# Patient Record
Sex: FEMALE | Race: WHITE | Employment: FULL TIME | URBAN - METROPOLITAN AREA
[De-identification: names, ages, dates, MRNs, and addresses within clinical notes are randomized per-mention and may not be internally consistent; named-entity substitution may affect disease eponyms.]

---

## 2019-08-20 LAB
ANTIBODY SCREEN, EXTERNAL: NEGATIVE
CHLAMYDIA, EXTERNAL: NEGATIVE
HBSAG, EXTERNAL: NEGATIVE
HIV, EXTERNAL: NON REACTIVE
N. GONORRHEA, EXTERNAL: NEGATIVE
RUBELLA, EXTERNAL: NORMAL
T. PALLIDUM, EXTERNAL: NON REACTIVE
TYPE, ABO & RH, EXTERNAL: NORMAL

## 2020-02-05 ENCOUNTER — HOSPITAL ENCOUNTER (EMERGENCY)
Age: 35
Discharge: HOME | End: 2020-02-05
Attending: EMERGENCY MEDICINE
Payer: COMMERCIAL

## 2020-02-05 ENCOUNTER — APPOINTMENT (OUTPATIENT)
Dept: VASCULAR SURGERY | Age: 35
End: 2020-02-05
Attending: EMERGENCY MEDICINE
Payer: COMMERCIAL

## 2020-02-05 VITALS
WEIGHT: 208 LBS | HEART RATE: 92 BPM | HEIGHT: 64 IN | DIASTOLIC BLOOD PRESSURE: 82 MMHG | TEMPERATURE: 98 F | RESPIRATION RATE: 18 BRPM | SYSTOLIC BLOOD PRESSURE: 124 MMHG | OXYGEN SATURATION: 98 % | BODY MASS INDEX: 35.51 KG/M2

## 2020-02-05 DIAGNOSIS — I82.90 THROMBUS: Primary | ICD-10-CM

## 2020-02-05 PROCEDURE — 99283 EMERGENCY DEPT VISIT LOW MDM: CPT

## 2020-02-05 PROCEDURE — 93971 EXTREMITY STUDY: CPT

## 2020-02-06 NOTE — ED TRIAGE NOTES
Pt ambulatory to ED with c/o right thigh redness and swelling onset Sunday with SOB. Pt is 33 weeks pregnant and was advised to be evaluated in ED for DVT.

## 2020-02-06 NOTE — ED PROVIDER NOTES
36 y.o. female who is 35 weeks pregnant with no known pmh presents today with c/o right thigh erythema and swelling. Sent from her OB/GYN to evaluate for possible DVT. Denies any chest pain or shortness of breath. No history of this in the past.  No other acute complaints. No pregnancy related complaints. Recent travel. She is not a smoker. There are no other acute medical concerns at this time. Social hx: Never Smoker, Never EtOH Use, Never illicit drug use    Note written by Ciarra Barnes, as dictated by Jenniffer Booth NP 8:52 PM       The history is provided by the patient. No  was used. History reviewed. No pertinent past medical history. History reviewed. No pertinent surgical history. History reviewed. No pertinent family history.     Social History     Socioeconomic History    Marital status: Not on file     Spouse name: Not on file    Number of children: Not on file    Years of education: Not on file    Highest education level: Not on file   Occupational History    Not on file   Social Needs    Financial resource strain: Not on file    Food insecurity:     Worry: Not on file     Inability: Not on file    Transportation needs:     Medical: Not on file     Non-medical: Not on file   Tobacco Use    Smoking status: Never Smoker    Smokeless tobacco: Never Used   Substance and Sexual Activity    Alcohol use: Never     Frequency: Never    Drug use: Never    Sexual activity: Not on file   Lifestyle    Physical activity:     Days per week: Not on file     Minutes per session: Not on file    Stress: Not on file   Relationships    Social connections:     Talks on phone: Not on file     Gets together: Not on file     Attends Zoroastrian service: Not on file     Active member of club or organization: Not on file     Attends meetings of clubs or organizations: Not on file     Relationship status: Not on file    Intimate partner violence:     Fear of current or ex partner: Not on file     Emotionally abused: Not on file     Physically abused: Not on file     Forced sexual activity: Not on file   Other Topics Concern    Not on file   Social History Narrative    Not on file         ALLERGIES: Patient has no known allergies. Review of Systems   Constitutional: Negative for chills and fever. HENT: Negative for congestion and sore throat. Eyes: Negative for pain. Respiratory: Negative for shortness of breath. Cardiovascular: Positive for leg swelling. Negative for chest pain. Gastrointestinal: Negative for abdominal pain, diarrhea, nausea and vomiting. Genitourinary: Negative for dysuria and flank pain. Musculoskeletal: Negative for back pain and neck pain. Leg pain   Skin: Negative for rash. Neurological: Negative for dizziness and headaches. All other systems reviewed and are negative. Vitals:    02/05/20 1943   BP: 141/79   Pulse: (!) 106   Resp: 18   Temp: 99 °F (37.2 °C)   SpO2: 98%   Weight: 94.3 kg (208 lb)   Height: 5' 4\" (1.626 m)            Physical Exam  Vitals signs and nursing note reviewed. Constitutional:       Appearance: She is well-developed. HENT:      Head: Normocephalic. Eyes:      Conjunctiva/sclera: Conjunctivae normal.   Neck:      Musculoskeletal: Normal range of motion and neck supple. Cardiovascular:      Rate and Rhythm: Normal rate and regular rhythm. Pulmonary:      Effort: Pulmonary effort is normal. No respiratory distress. Breath sounds: Normal breath sounds. Abdominal:      General: Bowel sounds are normal.      Palpations: Abdomen is soft. Tenderness: There is no abdominal tenderness. Musculoskeletal: Normal range of motion. Comments: Redness, swelling, erythema, and tenderness to the touch to the lower medial inner thigh near the knee. Lower leg has multiple varicosities. Do not appreciate significant leg swelling. Skin:     General: Skin is warm.       Capillary Refill: Capillary refill takes less than 2 seconds. Findings: No rash. Neurological:      Mental Status: She is alert and oriented to person, place, and time. Comments: No gross motor or sensory deficits   Psychiatric:         Mood and Affect: Mood normal.         Behavior: Behavior normal.        Note written by Ciarra Cortes, as dictated by Jeana Geller NP 8:52 PM    MDM  Number of Diagnoses or Management Options  Thrombus:   Diagnosis management comments: Patient is a 29-year-old female who is 33 weeks pregnant who presents with left lower medial thigh erythema, swelling, and tenderness localized at 3 cm area. She was sent to evaluate for DVT. Venous Doppler show superficial thrombus in the distal aspect of the greater saphenous vein. While this is normally treated with NSAIDs and warm compresses, she is at higher hypercoagulability risk given that she is pregnant. Discussed with Dr. Homa Ugalde as well as  from Vascular surgery. This area is localized to the distal aspect of the greater saphenous vein. Should it have been more proximal closer to the bifurcation of a deeper vein, anticoagulation would be considered and beneficial.  I discussed this with the patient. She can take Tylenol for discomfort but is unable to take anti-inflammatories. Recommend warm compresses. We will have her follow-up with her OB/GYN for recheck next week as scheduled and repeat ultrasound to look for resolution or extension of the thrombus. I have discussed all this with the patient. Return precautions given should her symptoms worsen in any way. Patient verbalizes understanding agrees with this plan.          Procedures

## 2020-03-05 LAB — GRBS, EXTERNAL: NEGATIVE

## 2020-03-24 ENCOUNTER — HOSPITAL ENCOUNTER (INPATIENT)
Age: 35
LOS: 3 days | Discharge: HOME | End: 2020-03-27
Attending: OBSTETRICS & GYNECOLOGY | Admitting: OBSTETRICS & GYNECOLOGY
Payer: COMMERCIAL

## 2020-03-24 PROBLEM — J45.20 MILD INTERMITTENT ASTHMA WITHOUT COMPLICATION: Status: ACTIVE | Noted: 2020-03-24

## 2020-03-24 PROBLEM — Z37.9 NORMAL LABOR: Status: ACTIVE | Noted: 2020-03-24

## 2020-03-24 PROBLEM — Z86.79 H/O: HYPERTENSION: Status: ACTIVE | Noted: 2020-03-24

## 2020-03-24 LAB
ALBUMIN SERPL-MCNC: 2.8 G/DL (ref 3.5–5)
ALBUMIN/GLOB SERPL: 0.7 {RATIO} (ref 1.1–2.2)
ALP SERPL-CCNC: 121 U/L (ref 45–117)
ALT SERPL-CCNC: 15 U/L (ref 12–78)
ANION GAP SERPL CALC-SCNC: 10 MMOL/L (ref 5–15)
AST SERPL-CCNC: 12 U/L (ref 15–37)
BILIRUB SERPL-MCNC: 0.4 MG/DL (ref 0.2–1)
BUN SERPL-MCNC: 6 MG/DL (ref 6–20)
BUN/CREAT SERPL: 11 (ref 12–20)
CALCIUM SERPL-MCNC: 9.5 MG/DL (ref 8.5–10.1)
CHLORIDE SERPL-SCNC: 104 MMOL/L (ref 97–108)
CO2 SERPL-SCNC: 24 MMOL/L (ref 21–32)
CREAT SERPL-MCNC: 0.53 MG/DL (ref 0.55–1.02)
CREAT UR-MCNC: 68.4 MG/DL
ERYTHROCYTE [DISTWIDTH] IN BLOOD BY AUTOMATED COUNT: 14.2 % (ref 11.5–14.5)
GLOBULIN SER CALC-MCNC: 3.8 G/DL (ref 2–4)
GLUCOSE SERPL-MCNC: 75 MG/DL (ref 65–100)
HCT VFR BLD AUTO: 37.2 % (ref 35–47)
HGB BLD-MCNC: 12.2 G/DL (ref 11.5–16)
MCH RBC QN AUTO: 29.4 PG (ref 26–34)
MCHC RBC AUTO-ENTMCNC: 32.8 G/DL (ref 30–36.5)
MCV RBC AUTO: 89.6 FL (ref 80–99)
NRBC # BLD: 0 K/UL (ref 0–0.01)
NRBC BLD-RTO: 0 PER 100 WBC
PLATELET # BLD AUTO: 205 K/UL (ref 150–400)
PMV BLD AUTO: 12.2 FL (ref 8.9–12.9)
POTASSIUM SERPL-SCNC: 3.9 MMOL/L (ref 3.5–5.1)
PROT SERPL-MCNC: 6.6 G/DL (ref 6.4–8.2)
PROT UR-MCNC: 20 MG/DL (ref 0–11.9)
PROT/CREAT UR-RTO: 0.3
RBC # BLD AUTO: 4.15 M/UL (ref 3.8–5.2)
SODIUM SERPL-SCNC: 138 MMOL/L (ref 136–145)
WBC # BLD AUTO: 16.1 K/UL (ref 3.6–11)

## 2020-03-24 PROCEDURE — 99218 HC RM OBSERVATION: CPT

## 2020-03-24 PROCEDURE — 75410000002 HC LABOR FEE PER 1 HR

## 2020-03-24 PROCEDURE — 85027 COMPLETE CBC AUTOMATED: CPT

## 2020-03-24 PROCEDURE — 65270000029 HC RM PRIVATE

## 2020-03-24 PROCEDURE — 84156 ASSAY OF PROTEIN URINE: CPT

## 2020-03-24 PROCEDURE — 36415 COLL VENOUS BLD VENIPUNCTURE: CPT

## 2020-03-24 PROCEDURE — 80053 COMPREHEN METABOLIC PANEL: CPT

## 2020-03-24 RX ORDER — TERBUTALINE SULFATE 1 MG/ML
0.25 INJECTION SUBCUTANEOUS AS NEEDED
Status: DISCONTINUED | OUTPATIENT
Start: 2020-03-24 | End: 2020-03-25 | Stop reason: HOSPADM

## 2020-03-24 NOTE — H&P
History & Physical    Name: Darleen Castrejon MRN: [id number]  SSN: [id number]    YOB: 1985  Age: 36 y.o. Sex: female        Subjective:     Estimated Date of Delivery: 3/28/20  OB History    Para Term  AB Living   1             SAB TAB Ectopic Molar Multiple Live Births                    # Outcome Date GA Lbr Daniel/2nd Weight Sex Delivery Anes PTL Lv   1 Current              cc: contractions    HPI: 35yo G1 @ 39w3d presents with contractions since 1400 yesterday. They were every 10-15 minutes overnight, and increased in intensity late this afternoon. She was seen in the office his morning and was 4cm/70%/-2. She was still relatively comfortable and preferred to labor at home for a while. Now she's feeling contractions every 1-2 minutes. Denies LOF. No other concerns today. Of note, patient has a h/o CHTN as a teenager. She states this resolved with dietary changes (became a vegetarian) and she was not taking any medications prior to pregnancy. 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 Baseline labs were obtained, but her BPs were normal at all subsequent visits. Today she denies HA, change in vision, RUQ pain.       Past Medical History:   Diagnosis Date    Asthma     Essential hypertension     Phlebitis and thrombophlebitis     during pregnancy     Past Surgical History:   Procedure Laterality Date    HX OTHER SURGICAL      wisdom teeth    HX OTHER SURGICAL      \"lung surgery\" as a premature infant     Social History     Occupational History    Not on file   Tobacco Use    Smoking status: Never Smoker    Smokeless tobacco: Never Used   Substance and Sexual Activity    Alcohol use: Never     Frequency: Never    Drug use: Never    Sexual activity: Yes     Family History   Problem Relation Age of Onset    Hypertension Mother     Cancer Father No Known Allergies  Prior to Admission medications    Medication Sig Start Date End Date Taking? Authorizing Provider   prenatal vit-iron fumarate-fa 27 mg iron- 0.8 mg tab tablet Take 1 Tab by mouth daily. Yes Other, MD Escobar        Review of Systems    Objective:     Vitals:  Vitals:    03/24/20 1552 03/24/20 1555 03/24/20 1616   BP: 147/88  (!) 142/92   Pulse: 93  98   Resp: 16     Temp: 98.2 °F (36.8 °C)     Weight:  98.4 kg (217 lb)    Height:  5' 4\" (1.626 m)         Physical Exam  GEN: NAD, but uncomfortable with contractions  Pulm: no resp distress  Abd: soft, gravid, NT  : deferred (5cm on admission per RN - was 4cm/70%/-2 in office earlier today)    FHTs: 145, moderate variability, 10x10 accelerations, no decelerations  Tocometry: every 1-3 minutes      Prenatal Labs:   Lab Results   Component Value Date/Time    Rubella, External Immune 08/20/2019    GrBStrep, External Negative 03/05/2020    HBsAg, External Negative 08/20/2019    HIV, External Non reactive 08/20/2019    Gonorrhea, External Negative 08/20/2019    Chlamydia, External Negative 08/20/2019    ABO,Rh A Positive 08/20/2019          Impression/Plan:     33yo G1 @ 39w3d admitted in term labor. 1. Labor: progressing well  2. IUP: overall reassuring fetal surveillance  3. Elevated BP x2 on admission with remote h/o CHTN; baseline P/C 0.1: check PreE labs  4. Asthma: rare albuterol use; currently asymptomatic  5. GBS negative  6. Hoping to avoid an epidural, but is open to considering nitrous oxide for pain mangement  7.  Obesity: starting BMI 33

## 2020-03-24 NOTE — PROGRESS NOTES
3/24/2020  3:39 PM Patient arrived from home. Had been in the office earlier this morning, was found to be 4cm on exam. Told to report to L&D when contractions became more regular/intense. @9700 Dr. Evelin Caruso updated on patient's arrival, states she will come see her after checking on triage patient.

## 2020-03-24 NOTE — PROGRESS NOTES
In to see patient  Currently off of fetal monitoring and walking around the room  Contractions are getting more intense  Cervix 6cm/90%/-2 intact    Last BP normal, PreE labs normal (P/C pending)    Labor progressing well, continue expectant management

## 2020-03-25 PROCEDURE — 65410000002 HC RM PRIVATE OB

## 2020-03-25 PROCEDURE — 75410000000 HC DELIVERY VAGINAL/SINGLE

## 2020-03-25 PROCEDURE — 74011250637 HC RX REV CODE- 250/637: Performed by: MIDWIFE

## 2020-03-25 PROCEDURE — 75410000002 HC LABOR FEE PER 1 HR

## 2020-03-25 PROCEDURE — 0KQM0ZZ REPAIR PERINEUM MUSCLE, OPEN APPROACH: ICD-10-PCS | Performed by: OBSTETRICS & GYNECOLOGY

## 2020-03-25 PROCEDURE — 75410000003 HC RECOV DEL/VAG/CSECN EA 0.5 HR

## 2020-03-25 RX ORDER — OXYTOCIN 10 [USP'U]/ML
INJECTION, SOLUTION INTRAMUSCULAR; INTRAVENOUS
Status: DISCONTINUED
Start: 2020-03-25 | End: 2020-03-25 | Stop reason: WASHOUT

## 2020-03-25 RX ORDER — AMMONIA 15 % (W/V)
1 AMPUL (EA) INHALATION AS NEEDED
Status: DISCONTINUED | OUTPATIENT
Start: 2020-03-25 | End: 2020-03-27 | Stop reason: HOSPADM

## 2020-03-25 RX ORDER — OXYTOCIN/RINGER'S LACTATE 20/1000 ML
125 PLASTIC BAG, INJECTION (ML) INTRAVENOUS AS NEEDED
Status: DISCONTINUED | OUTPATIENT
Start: 2020-03-25 | End: 2020-03-27 | Stop reason: HOSPADM

## 2020-03-25 RX ORDER — HYDROCORTISONE ACETATE PRAMOXINE HCL 2.5; 1 G/100G; G/100G
CREAM TOPICAL AS NEEDED
Status: DISCONTINUED | OUTPATIENT
Start: 2020-03-25 | End: 2020-03-27 | Stop reason: HOSPADM

## 2020-03-25 RX ORDER — HYDROCORTISONE 1 %
CREAM (GRAM) TOPICAL AS NEEDED
Status: DISCONTINUED | OUTPATIENT
Start: 2020-03-25 | End: 2020-03-27 | Stop reason: HOSPADM

## 2020-03-25 RX ORDER — DOCUSATE SODIUM 100 MG/1
100 CAPSULE, LIQUID FILLED ORAL
Status: DISCONTINUED | OUTPATIENT
Start: 2020-03-25 | End: 2020-03-27 | Stop reason: HOSPADM

## 2020-03-25 RX ORDER — SIMETHICONE 80 MG
80 TABLET,CHEWABLE ORAL
Status: DISCONTINUED | OUTPATIENT
Start: 2020-03-25 | End: 2020-03-27 | Stop reason: HOSPADM

## 2020-03-25 RX ORDER — OXYTOCIN/RINGER'S LACTATE 20/1000 ML
999 PLASTIC BAG, INJECTION (ML) INTRAVENOUS AS NEEDED
Status: DISCONTINUED | OUTPATIENT
Start: 2020-03-25 | End: 2020-03-27 | Stop reason: HOSPADM

## 2020-03-25 RX ORDER — ONDANSETRON 4 MG/1
4 TABLET, ORALLY DISINTEGRATING ORAL
Status: DISCONTINUED | OUTPATIENT
Start: 2020-03-25 | End: 2020-03-27 | Stop reason: HOSPADM

## 2020-03-25 RX ORDER — SODIUM CHLORIDE 0.9 % (FLUSH) 0.9 %
5-40 SYRINGE (ML) INJECTION AS NEEDED
Status: DISCONTINUED | OUTPATIENT
Start: 2020-03-25 | End: 2020-03-27 | Stop reason: HOSPADM

## 2020-03-25 RX ORDER — ACETAMINOPHEN 325 MG/1
650 TABLET ORAL
Status: DISCONTINUED | OUTPATIENT
Start: 2020-03-25 | End: 2020-03-27 | Stop reason: HOSPADM

## 2020-03-25 RX ORDER — IBUPROFEN 400 MG/1
800 TABLET ORAL EVERY 8 HOURS
Status: DISCONTINUED | OUTPATIENT
Start: 2020-03-25 | End: 2020-03-27 | Stop reason: HOSPADM

## 2020-03-25 RX ORDER — OXYCODONE AND ACETAMINOPHEN 5; 325 MG/1; MG/1
1 TABLET ORAL
Status: DISCONTINUED | OUTPATIENT
Start: 2020-03-25 | End: 2020-03-27 | Stop reason: HOSPADM

## 2020-03-25 RX ORDER — DIPHENHYDRAMINE HCL 25 MG
25 CAPSULE ORAL
Status: DISCONTINUED | OUTPATIENT
Start: 2020-03-25 | End: 2020-03-27 | Stop reason: HOSPADM

## 2020-03-25 RX ORDER — ZOLPIDEM TARTRATE 5 MG/1
5 TABLET ORAL
Status: DISCONTINUED | OUTPATIENT
Start: 2020-03-25 | End: 2020-03-27 | Stop reason: HOSPADM

## 2020-03-25 RX ORDER — LIDOCAINE HYDROCHLORIDE 10 MG/ML
INJECTION INFILTRATION; PERINEURAL
Status: DISPENSED
Start: 2020-03-25 | End: 2020-03-25

## 2020-03-25 RX ADMIN — IBUPROFEN 800 MG: 400 TABLET, FILM COATED ORAL at 09:40

## 2020-03-25 RX ADMIN — IBUPROFEN 800 MG: 400 TABLET, FILM COATED ORAL at 17:31

## 2020-03-25 RX ADMIN — DOCUSATE SODIUM 100 MG: 100 CAPSULE, LIQUID FILLED ORAL at 17:33

## 2020-03-25 NOTE — PROGRESS NOTES
CNM Labor Progress Note     Patient: Alisson Carter MRN: [id number]  SSN: [id number]    YOB: 1985  Age: 36 y.o. Sex: female        Subjective:   Patient coping well with contractions without epidural and is low intervention. Pt in bed with EFM. Desires AROM. Objective:   Blood pressure 132/74, pulse 87, temperature 98.3 °F (36.8 °C), resp. rate 16, height 5' 4\" (1.626 m), weight 98.4 kg (217 lb), last menstrual period 2019, not currently breastfeeding. Patient Vitals for the past 4 hrs:    Mode Fetal Heart Rate Fetal Activity Variability Decelerations Accelerations RN Reviewed Strip?   20 0408  145  6-25 BPM  Yes Yes   20 0237 External 150 Present 6-25 BPM None No Yes   20 0226 External 150 Present       20 0219 External 150 Present       20 0207 External 150 Present    Yes   0515 External 140, +FM, moderated, +accels, no decels  Uterine contractions q 2-4 minutes, 60-70s, moderate to palpation, resting tone soft;    Sterile Vaginal Exam: 8/90 %/-1/     Assessment:    SIUP @ 39w4d by Estimated Date of Delivery: 3/28/20  Category 1 fetal heart rate tracing   GBS status: negative  Labor  AROM with meconium    Plan:   Continue current orders/management    - AROM  - Discussed potential causes of meconium, reassuring EFM, potential for additional NICU support at delivery if EFM indicates  Pain management partner support, movement, etc.  All questions answered and pt/partner agree with plan of care  CNM management   Anticipate     Collaborative MD: Dewey Husain    Signed By:  Kathleen Skiff, CNM      3/25/2020 5:28 AM

## 2020-03-25 NOTE — PROCEDURES
CNM Delivery Note       Patient: Joshua Palacio MRN: [id number]  SSN: [id number]    YOB: 1985  Age: 36 y.o. Sex: female      Delivery Information Summary    Joshua Palacio is a 36 y.o. female admitted for Normal labor [O80, Z37.9]. Maternal History:     Patient Active Problem List    Diagnosis Date Noted    Normal labor 2020    H/O: hypertension 2020    Mild intermittent asthma without complication       UNKNOWN  Past Medical History:   Diagnosis Date    Asthma     Essential hypertension     Phlebitis and thrombophlebitis     during pregnancy      Past Surgical History:   Procedure Laterality Date    HX OTHER SURGICAL      wisdom teeth    HX OTHER SURGICAL      \"lung surgery\" as a premature infant     No Known Allergies   Family History   Problem Relation Age of Onset    Hypertension Mother     Cancer Father         Delivery Narrative:     Delivery Summary    Patient: Joshua Palacio MRN: [id number]  SSN: [id number]    YOB: 1985  Age: 36 y.o. Sex: female       Information for the patient's :  Anthony Starr [[id number]]       Labor Events:    Labor: No    Steroids: None   Cervical Ripening Date/Time:       Cervical Ripening Type: None   Antibiotics During Labor: No   Rupture Identifier: Sac 1    Rupture Date/Time: 3/25/2020 5:12 AM   Rupture Type: AROM   Amniotic Fluid Volume:  Moderate    Amniotic Fluid Description: Meconium    Amniotic Fluid Odor: None    Induction: None       Induction Date/Time:        Indications for Induction:      Augmentation: None   Augmentation Date/Time:      Indications for Augmentation:     Labor complications: None       Additional complications:        Delivery Events:  Indications For Episiotomy:     Episiotomy: None   Perineal Laceration(s): 2nd   Repaired: Yes   Periurethral Laceration Location:      Repaired:     Labial Laceration Location:     Repaired:     Sulcal Laceration Location:     Repaired:     Vaginal Laceration Location:     Repaired:     Cervical Laceration Location:     Repaired:     Repair Suture: Vicryl 3-0   Number of Repair Packets: 1   Estimated Blood Loss (ml): 350ml   Quantitative Blood Loss (ml)                Delivery Date: 3/25/2020    Delivery Time: 7:20 AM  Delivery Type: Vaginal, Spontaneous  Sex:  Female    Gestational Age: 43w3d   Delivery Clinician:  Saskia Bullard  Living Status: Living   Delivery Location: L&D L&D 11          APGARS  One minute Five minutes Ten minutes   Skin color: 0   1        Heart rate: 2   2        Grimace: 2   2        Muscle tone: 2   2        Breathin   2        Totals: 7   9            Presentation: Vertex    Position:   Occiput Anterior  Resuscitation Method:  Tactile Stimulation;Suctioning-bulb; Oxygen;Suctioning-deep     Meconium Stained: Thick      Cord Information: 3 Vessels  Complications: Nuchal Cord Without Compressions  Cord around: head  Delayed cord clamping? Yes  Cord clamped date/time:3/25/2020  7:25 AM  Disposition of Cord Blood: Discard    Blood Gases Sent?: No    Placenta:  Date/Time: 3/25/2020  7:31 AM  Removal: Spontaneous      Appearance: Normal      Measurements:  Birth Weight:        Birth Length:        Head Circumference:        Chest Circumference:       Abdominal Girth: Other Providers:   BRIANNE ROJAS;MARIO ESPINOZA;VIVIEN SERRANO;OBDULIA WILLIS, Primary Nurse;Primary [place] Nurse;Midwife;Staff Nurse           Group B Strep:   Lab Results   Component Value Date/Time    GrKRISTOPHERtreerich, External Negative 2020     Maternal A Positive    Delivery Summary:                        Complete cervical dilation at 1295  of a live female infant at 0 in Occiput Anterior position under cayla anesthesia with mother in semifowlers position. Shoulders easily and spontaneously delivered with maternal effort. Vigorous infant placed skin to skin on maternal abdomen immediately following delivery. Repair done under lidocaine analgesia; infant brought to warmer for deep suction after 5 minutes d/t grunting with resolution. Third Stage:  Pitocin infusion bolus after delivery of infant for active management of the third stage. Placenta and membranes: spontaneous    Placenta Appearance: normal intact  Umbilical Cord: Nuchal Cord x  2 and 3 vessels present via Miguel Hearing mechanism at 4257. Fundus noted as firm. Mother and infant stable, bonding, establishing breastfeeding.            Collaborative MD: Susana Mendoza      Signed By:  Pola Barksdale CNM      3/25/2020 8:06 AM

## 2020-03-25 NOTE — LACTATION NOTE
This note was copied from a baby's chart. Full consult and all teaching provided to first time parents. Baby nursing well today,  deep latch obtained, mother is comfortable, baby feeding vigorously with rhythmic suck, swallow, breathe pattern, audible swallowing, and evident milk transfer, both breasts offered, baby is asleep following feeding. Mother had changes in breasts during pregnancy and ample colostrum today. Parents took a breastfeeding class. Mother has a breast pump at home. Mother states that she has no further questions for Lactation Consultant at this time, and will call for assistance as needed.

## 2020-03-25 NOTE — PROGRESS NOTES
8394: Bedside and Verbal shift change report given to A. Jonnie Homans RN (oncoming nurse) by FREDDIE Rodriguez RN and FREDDIE Pina RN (offgoing nurse). Report included the following information SBAR, Intake/Output and MAR.     0940: Motrin given. 1015: Patient up to bathroom with RN assistance. Patient did not feel urge to void at this time. Pad changed at this time. 1040: TRANSFER - OUT REPORT:    Verbal report given to HOWARD Washington RN(name) on Darleen Castrejon  being transferred to MIU(unit) for routine progression of care       Report consisted of patients Situation, Background, Assessment and   Recommendations(SBAR). Information from the following report(s) SBAR, Intake/Output and MAR was reviewed with the receiving nurse. Lines:       Opportunity for questions and clarification was provided.       Patient transported with:   Registered Nurse

## 2020-03-25 NOTE — PROGRESS NOTES
CNM Labor Progress Note     Patient: Rayo Olmos MRN: [id number]  SSN: [id number]    YOB: 1985  Age: 36 y.o. Sex: female        Subjective:   Patient coping well with contractions and desires low intervention. Spouse is providing great support. Pt/spouse understand the indication for IV if BP's continue to be elevated. Also explained use of PPE to which pt/spouse expressed understanding and appreciation. Objective:   Blood pressure 135/83, pulse 96, temperature 98.2 °F (36.8 °C), resp. rate 20, height 5' 4\" (1.626 m), weight 98.4 kg (217 lb), last menstrual period 2019, not currently breastfeeding. Patient Vitals for the past 4 hrs: Mode Fetal Heart Rate Fetal Activity Variability Decelerations Accelerations RN Reviewed Strip? Provider who reviewed strip?   20 External 154     Yes    20 External 155  6-25 BPM None Yes Yes    20 External 155  6-25 BPM None No Yes    20 1951 External 155     Yes    20 1826 External 155 Present 6-25 BPM       20 1821 External 150         20 1718 External 145 Present 6-25 BPM  No Yes Dr. Jaleel Noyola     Uterine contractions q 2-4 minutes, 60-90s, strong per patient, resting tone soft;    Sterile Vaginal Exam: 6/80 %/-2/  @ 1915    Assessment:    SIUP @ 39w3d by Estimated Date of Delivery: 3/28/20  Category 1 fetal heart rate tracing   GBS status: negative  H/o Asthma  Remote h/o CHTN  Labor    Plan:   Continue current orders/management    - Assuming care of patient  - discussed IV access if BP continues to be elevated;   - reviewed labs COMPET and CBC WNL but PCR 0.3; however, RN reports uremia.   Pain management freedom of movement, spouse support  All questions answered and pt/partner agree with plan of care  CNM management   Anticipate     Collaborative MD: Leo Vogel    Signed By:  Radha Butler CNM      3/24/2020 9:05 PM

## 2020-03-25 NOTE — ROUTINE PROCESS
1045: TRANSFER - IN REPORT: 
 
Verbal report received from Keith Clifford RN(name) on Cony Pino  being received from L&D(unit) for routine progression of care Report consisted of patients Situation, Background, Assessment and  
Recommendations(SBAR). Information from the following report(s) SBAR was reviewed with the receiving nurse. Opportunity for questions and clarification was provided. Assessment completed upon patients arrival to unit and care assumed. 1250: 1 hour check delayed due to lactation assisting with breastfeeding

## 2020-03-25 NOTE — PROGRESS NOTES
Post-Partum Day Number 0 Progress Note    Harrison Bhavin Dillon     Assessment:   Doing well, post partum day 0   CHTN no meds  Asthma, stable  Baby girl    Plan:  1. Continue routine postpartum and perineal care as well as maternal education. Information for the patient's :  Ely Howell [[id number]]   Vaginal, Spontaneous   Patient doing well without significant complaint. Voiding without difficulty, normal lochia. Vitals:  Visit Vitals  /85 (BP 1 Location: Right arm, BP Patient Position: At rest)   Pulse 98   Temp 98.6 °F (37 °C) Comment: delayed due to lactation assisting with breastfeeding   Resp 16   Ht 5' 4\" (1.626 m)   Wt 98.4 kg (217 lb)   LMP 2019   SpO2 93%   Breastfeeding Unknown   BMI 37.25 kg/m²     Temp (24hrs), Av.4 °F (36.9 °C), Min:98.2 °F (36.8 °C), Max:98.9 °F (37.2 °C)        Exam:   Patient without distress. Abdomen soft, fundus firm, nontender                Perineum with normal lochia noted. Lower extremities are negative for swelling, cords or tenderness.     Labs:     Lab Results   Component Value Date/Time    WBC 16.1 (H) 2020 05:20 PM    HGB 12.2 2020 05:20 PM    HCT 37.2 2020 05:20 PM    PLATELET 545  05:20 PM       Recent Results (from the past 24 hour(s))   CBC W/O DIFF    Collection Time: 20  5:20 PM   Result Value Ref Range    WBC 16.1 (H) 3.6 - 11.0 K/uL    RBC 4.15 3.80 - 5.20 M/uL    HGB 12.2 11.5 - 16.0 g/dL    HCT 37.2 35.0 - 47.0 %    MCV 89.6 80.0 - 99.0 FL    MCH 29.4 26.0 - 34.0 PG    MCHC 32.8 30.0 - 36.5 g/dL    RDW 14.2 11.5 - 14.5 %    PLATELET 631 812 - 585 K/uL    MPV 12.2 8.9 - 12.9 FL    NRBC 0.0 0  WBC    ABSOLUTE NRBC 0.00 0.00 - 2.26 K/uL   METABOLIC PANEL, COMPREHENSIVE    Collection Time: 20  5:32 PM   Result Value Ref Range    Sodium 138 136 - 145 mmol/L    Potassium 3.9 3.5 - 5.1 mmol/L    Chloride 104 97 - 108 mmol/L    CO2 24 21 - 32 mmol/L Anion gap 10 5 - 15 mmol/L    Glucose 75 65 - 100 mg/dL    BUN 6 6 - 20 MG/DL    Creatinine 0.53 (L) 0.55 - 1.02 MG/DL    BUN/Creatinine ratio 11 (L) 12 - 20      GFR est AA >60 >60 ml/min/1.73m2    GFR est non-AA >60 >60 ml/min/1.73m2    Calcium 9.5 8.5 - 10.1 MG/DL    Bilirubin, total 0.4 0.2 - 1.0 MG/DL    ALT (SGPT) 15 12 - 78 U/L    AST (SGOT) 12 (L) 15 - 37 U/L    Alk. phosphatase 121 (H) 45 - 117 U/L    Protein, total 6.6 6.4 - 8.2 g/dL    Albumin 2.8 (L) 3.5 - 5.0 g/dL    Globulin 3.8 2.0 - 4.0 g/dL    A-G Ratio 0.7 (L) 1.1 - 2.2     PROTEIN/CREATININE RATIO, URINE    Collection Time: 03/24/20  6:29 PM   Result Value Ref Range    Protein, urine random 20 (H) 0.0 - 11.9 mg/dL    Creatinine, urine 68.40 mg/dL    Protein/Creat.  urine Ratio 0.3

## 2020-03-25 NOTE — PROGRESS NOTES
1940 - Bedside and Verbal shift change report given to FREDDIE Almonte RN/FREDDIE Sierra (oncoming nurse) by Nj Myers RN (offgoing nurse). Report included the following information SBAR, ED Summary and Procedure Summary. 1945 - Patient up to bathroom. 1951 - Patient placed on monitor. 2032 - Spoke to An Abarca CNM, informed of FHT, current BPs and pt's current contraction status. 2037 - Patient off monitor. Spoke to patient about possibly starting and IV, for an IV bolus if necessary, patient is ok with that intervention if needed. 2040 - Pool Channel CNM in room speaking to patient, has reviewed FHT. 0018 - SVE performed per patient request, 7/80/-2, FREDDIE Aguirre, [address] - Patient place in knee-chest.    9828 - Patient out of knee-chest tolerate procedure well, currently up ad julia, had a bloody streak run down leg and a tip of pinky size clot noted. Patient inquiring if her water broke. No further leakage noted will continue to monitor. 0230 - Spinning baby maneuver completed, patient tolerated procedure well,  Patient currently up to birthing [place]. [facility] with patient about having her water broken to help progress labor. Patient is ok with this intervention, will call Vinay Luis to come assess patient. 1119 - N. Nella Lites at bedside, AROM and SVE 8/80/-1. Meconium noted. 8708 - Patient states she feels pressure with each contraction. 744 [facility] notified of patient current status possible delivery soon. 4359 - N. Tru Doty at bedside for delivery. Pushing initiated. 0750 - Bedside and Verbal shift change report given to FREDDIE Martinez RN (oncoming nurse) by FREDDIE Almonte RN/FREDDIE Albarado RN (offgoing nurse). Report included the following information SBAR, Procedure Summary, Accordion and Recent Results.

## 2020-03-26 PROCEDURE — 74011250637 HC RX REV CODE- 250/637: Performed by: MIDWIFE

## 2020-03-26 PROCEDURE — 65410000002 HC RM PRIVATE OB

## 2020-03-26 RX ORDER — IBUPROFEN 600 MG/1
600 TABLET ORAL
Qty: 30 TAB | Refills: 1 | Status: SHIPPED | OUTPATIENT
Start: 2020-03-26 | End: 2022-05-18

## 2020-03-26 RX ADMIN — IBUPROFEN 800 MG: 400 TABLET, FILM COATED ORAL at 01:29

## 2020-03-26 RX ADMIN — IBUPROFEN 800 MG: 400 TABLET, FILM COATED ORAL at 09:51

## 2020-03-26 RX ADMIN — IBUPROFEN 800 MG: 400 TABLET, FILM COATED ORAL at 17:26

## 2020-03-26 NOTE — LACTATION NOTE
This note was copied from a baby's chart. Mom requested visit due to sore nipple especially on left. Nipple is intact, but bruised on left; nurse on previous shift gave mom a shield for left. Left nipple is slightly short, but everted after reverse pressure massage. Discussed with parent: positioning and alternating positions, using pillows to support nursing couplet, characteristics deep v shallow latch, and feeding cues. Demonstrated breast massage and hand expression with drops of colostrum easily expressed. Assisted mom to latch infant in sidelying position. Baby nursing well today,  deep latch obtained, mother is comfortable, baby feeding vigorously with rhythmic suck, swallow, breathe pattern, audible swallowing, and evident milk transfer, both breasts offered, baby is asleep following feeding.  Discussed weaning off shield as nipple heals

## 2020-03-26 NOTE — ROUTINE PROCESS
Bedside and Verbal shift change report given to HIRA Knight (oncoming nurse) by Anyi Billingsley. Ermelinda Rios RN (offgoing nurse).  Report included the following information SBAR

## 2020-03-26 NOTE — PROGRESS NOTES
Post-Partum Day Number 1 Progress Note    Hipolito Haynes     Assessment:   Doing well, post partum day 1   CHTN no meds  Asthma, stable  Baby girl     Plan:  1. Continue routine postpartum and perineal care as well as maternal education. 2. Anticipate discharge home in AM.     Information for the patient's :  Lorenzo Barrientos [[id number]]   Vaginal, Spontaneous   Patient doing well without significant complaint. Voiding without difficulty, normal lochia. Vitals:  Visit Vitals  /75 (BP 1 Location: Right arm, BP Patient Position: At rest)   Pulse 100   Temp 98.2 °F (36.8 °C)   Resp 16   Ht 5' 4\" (1.626 m)   Wt 98.4 kg (217 lb)   LMP 2019   SpO2 93%   Breastfeeding Unknown   BMI 37.25 kg/m²     Temp (24hrs), Av.4 °F (36.9 °C), Min:97.6 °F (36.4 °C), Max:98.9 °F (37.2 °C)        Exam:   Patient without distress. Abdomen soft, fundus firm, nontender                Perineum with normal lochia noted. Lower extremities are negative for swelling, cords or tenderness. Labs:     Lab Results   Component Value Date/Time    WBC 16.1 (H) 2020 05:20 PM    HGB 12.2 2020 05:20 PM    HCT 37.2 2020 05:20 PM    PLATELET 920  05:20 PM       No results found for this or any previous visit (from the past 24 hour(s)).

## 2020-03-26 NOTE — ROUTINE PROCESS
-1950 OB SBAR Report received from HIRA Matt Speaker, Joseph Howe Mom called out, stated she felt dizzy as she was breast feeding and sudden chills that quickly went away. A/Ox3; No changes in vision, HA, n/v, dizziness noted at this time. Pt states, \"I think I'm just tired and haven't slept. I'm just now eating. \" Pt states that she feels \"normal\" and would like for NB to go to NBN. Mom instructed to call out again if she notices any additional changes. Pt verbalizes understanding and family also agreed to call. Pt not experiencing any of the symptoms at this time. Staff will continue to monitor. NB taken to NBN per request of mom and as suggested by this RN.  
 
-0130 Hourly rounding, pt noted to be sleeping with RR >12. Will continue to monitor.  
 
-0330 Pt states she's feeling better, no additional dizziness at this time. States getting rest made her feel better since she hasn't truly slept in the last two days. VS stable, pt A/O, staff will continue to monitor.

## 2020-03-26 NOTE — PROGRESS NOTES
Bedside and Verbal shift change report given to JOSE Chase (oncoming nurse) by Onel Santacruz RN (offgoing nurse). Report included the following information SBAR.

## 2020-03-26 NOTE — DISCHARGE SUMMARY
Obstetrical Discharge Summary     Name: David Guy MRN: [id number]  SSN: [id number]    YOB: 1985  Age: 36 y.o. Sex: female      Admit Date: 3/24/2020    Discharge Date: 3/27/2020    Admitting Physician: Rossy Burdick MD     Attending Physician:  Ra Lopez MD     Admission Diagnoses: Normal labor [O80, Z37.9]    Discharge Diagnoses:   Information for the patient's :  Jaimie Vasquez [[id number]]   Delivery of a 3.165 kg female infant via Vaginal, Spontaneous on 3/25/2020 at 7:20 AM  by Sandy Case. Apgars were 7  and 9 . Additional Diagnoses:   Hospital Problems  Never Reviewed          Codes Class Noted POA    Normal labor ICD-10-CM: [de-identified], Z37.9  ICD-9-CM: 552  3/24/2020 Unknown        H/O: hypertension ICD-10-CM: Z86.79  ICD-9-CM: V12.59  3/24/2020 Yes    Overview Signed 3/24/2020  9:12 PM by Dave Sibley CNM     Suspect H/o CHTN;              Mild intermittent asthma without complication LQY-17-GL: I52.70  ICD-9-CM: 493.90  3/24/2020 Yes             Lab Results   Component Value Date/Time    Rubella, External Immune 2019    GrBStrep, External Negative 2020       Hospital Course: Normal hospital course following the delivery. Patient Instructions:   Current Discharge Medication List      START taking these medications    Details   ibuprofen (MOTRIN) 600 mg tablet Take 1 Tab by mouth every six (6) hours as needed for Pain. Qty: 30 Tab, Refills: 1         CONTINUE these medications which have NOT CHANGED    Details   prenatal vit-iron fumarate-fa 27 mg iron- 0.8 mg tab tablet Take 1 Tab by mouth daily. Disposition at Discharge: Home or self care    Condition at Discharge: Stable    Reference my discharge instructions.     Follow-up Appointments   Procedures    FOLLOW UP VISIT Appointment in: 6 Weeks     Standing Status:   Standing     Number of Occurrences:   1     Order Specific Question:   Appointment in     Answer:   6 Weeks Signed By:  Meryl Roque MD     March 26, 2020

## 2020-03-27 VITALS
RESPIRATION RATE: 16 BRPM | TEMPERATURE: 98.8 F | HEIGHT: 64 IN | OXYGEN SATURATION: 93 % | HEART RATE: 91 BPM | BODY MASS INDEX: 37.05 KG/M2 | DIASTOLIC BLOOD PRESSURE: 78 MMHG | SYSTOLIC BLOOD PRESSURE: 125 MMHG | WEIGHT: 217 LBS

## 2020-03-27 PROCEDURE — 74011250637 HC RX REV CODE- 250/637: Performed by: MIDWIFE

## 2020-03-27 RX ADMIN — IBUPROFEN 800 MG: 400 TABLET, FILM COATED ORAL at 11:56

## 2020-03-27 RX ADMIN — IBUPROFEN 800 MG: 400 TABLET, FILM COATED ORAL at 03:45

## 2020-03-27 NOTE — PROGRESS NOTES
Post-Partum Day Number 2 Progress Note    Lj Haynes     Assessment:   Doing well, post partum day 2  CHTN no meds  Asthma, stable  Baby girl    Plan:   1. Discharge home today  2. Follow up in office in 6 weeks with Marv Harrington MD  3. Post partum activity advised, diet as tolerated  4. Discharge Medications: ibuprofen, percocet and medications prior to admission    Information for the patient's :  Ann-Marie Trent [[id number]]   Vaginal, Spontaneous   Patient doing well without significant complaint. Voiding without difficulty, normal lochia. Vitals:  Visit Vitals  /78 (BP 1 Location: Left arm, BP Patient Position: At rest)   Pulse 91   Temp 98.8 °F (37.1 °C)   Resp 16   Ht 5' 4\" (1.626 m)   Wt 98.4 kg (217 lb)   LMP 2019   SpO2 93%   Breastfeeding Unknown   BMI 37.25 kg/m²     Temp (24hrs), Av.3 °F (36.8 °C), Min:98 °F (36.7 °C), Max:98.8 °F (37.1 °C)      Exam:         Patient without distress. Abdomen soft, fundus firm, nontender                 Lower extremities are negative for swelling, cords or tenderness. Labs:     Lab Results   Component Value Date/Time    WBC 16.1 (H) 2020 05:20 PM    HGB 12.2 2020 05:20 PM    HCT 37.2 2020 05:20 PM    PLATELET 377  05:20 PM       No results found for this or any previous visit (from the past 24 hour(s)).

## 2020-03-27 NOTE — LACTATION NOTE
This note was copied from a baby's chart. Infant seen at breast, deep latch noted. Mom's breasts are filling. Breasts may become engorged when milk \"comes in\". How milk is made / normal phases of milk production, supply and demand discussed. Taught care of engorged breasts - frequent breastfeeding encouraged, warm compresses and breast massage ac. Then nurse the baby or pump. Apply cold compresses pc x 15 minutes a few times a day for swelling or discomfort. May need to do this care for a couple of days.

## 2020-03-27 NOTE — ROUTINE PROCESS
Bedside and Verbal shift change report given to HERMELINDO Gross RN (oncoming nurse) by Fatmata Sagastume RN (offgoing nurse). Report included the following information SBAR.

## 2020-03-27 NOTE — DISCHARGE INSTRUCTIONS
POSTPARTUM DISCHARGE INSTRUCTIONS       Name:  Avery Olszewski  YOB: 1985  Admission Diagnosis:  Normal labor [O80, Z37.9]     Discharge Diagnosis:    Problem List as of 3/26/2020 Never Reviewed          Codes Class Noted - Resolved    Normal labor ICD-10-CM: O80, Z37.9  ICD-9-CM: 779  3/24/2020 - Present        H/O: hypertension ICD-10-CM: Z86.79  ICD-9-CM: V12.59  3/24/2020 - Present    Overview Signed 3/24/2020  9:12 PM by Jey Cronin CNM     Suspect H/o CHTN;              Mild intermittent asthma without complication DQS-65-HP: C23.29  ICD-9-CM: 493.90  3/24/2020 - Present            Attending Physician:  Debbie López MD    Delivery Type:  Vaginal Childbirth: What To Expect At Home    Your Recovery: Your body will slowly heal in the next few weeks. It is easy to get too tired and overwhelmed during the first weeks after your baby is born. Changes in your hormones can shift your mood without warning. You may find it hard to meet the extra demands on your energy and time. Take it easy on yourself. Follow-up care is a key part of your treatment and safety. Be sure to make and go to all appointments, and call your doctor if you are having problems. It's also a good idea to know your test results and keep a list of the medicines you take. How can you care for yourself at home? Vaginal bleeding and cramps  · After delivery, you will have a bloody discharge from the vagina. This will turn pink within a week and then white or yellow after about 10 days. It may last for 2 to 4 weeks or longer, until the uterus has healed. Use pads instead of tampons until you stop bleeding. · Do not worry if you pass some blood clots, as long as they are smaller than a golf ball. If you have a tear or stitches in your vaginal area, change the pad at least every 4 hours to prevent soreness and infection. · You may have cramps for the first few days after childbirth.  These are normal and occur as the uterus shrinks to normal size. Take an over-the-counter pain medicine, such as acetaminophen (Tylenol), ibuprofen (Advil, Motrin), or naproxen (Aleve), for cramps. Read and follow all instructions on the label. Do not take aspirin, because it can cause more bleeding. Do not take acetaminophen (Tylenol) and other acetaminophen containing medications (i.e. Percocet) at the same time. Breast fullness  · Your breasts may overfill (engorge) in the first few days after delivery. To help milk flow and to relieve pain, warm your breasts in the shower or by using warm, moist towels before nursing. · If you are not nursing, do not put warmth on your breasts or touch your breasts. Wear a tight bra or sports bra and use ice until the fullness goes away. This usually takes 2 to 3 days. · Put ice or a cold pack on your breast after nursing to reduce swelling and pain. Put a thin cloth between the ice and your skin. Activity  · Eat a balanced diet. Do not try to lose weight by cutting calories. Keep taking your prenatal vitamins, or take a multivitamin. · Get as much rest as you can. Try to take naps when your baby sleeps during the day. · Get some exercise every day. But do not do any heavy exercise until your doctor says it is okay. · Wait until you are healed (about 4 to 6 weeks) before you have sexual intercourse. Your doctor will tell you when it is okay to have sex. · Talk to your doctor about birth control. You can get pregnant even before your period returns. Also, you can get pregnant while you are breast-feeding. Mental Health  · Many women get the \"baby blues\" during the first few days after childbirth. You may lose sleep, feel irritable, and cry easily. You may feel happy one minute and sad the next. Hormone changes are one cause of these emotional changes. Also, the demands of a new baby, along with visits from relatives or other family needs, add to a mother's stress.  The \"baby blues\" often peak around the fourth day. Then they ease up in less than 2 weeks. · If your moodiness or anxiety lasts for more than 2 weeks, or if you feel like life is not worth living, you may have postpartum depression. This is different for each mother. Some mothers with serious depression may worry intensely about their infant's well-being. Others may feel distant from their child. Some mothers might even feel that they might harm their baby. A mother may have signs of paranoia, wondering if someone is watching her. · With all the changes in your life, you may not know if you are depressed. Pregnancy sometimes causes changes in how you feel that are similar to the symptoms of depression. · Symptoms of depression include:  · Feeling sad or hopeless and losing interest in daily activities. These are the most common symptoms of depression. · Sleeping too much or not enough. · Feeling tired. You may feel as if you have no energy. · Eating too much or too little. · [employer]UM [employer] ([employer]) offers a Warm line; Chat with the Expert phone sessions; Information and Articles about Pregnancy and Postpartum Mood Disorders; Comprehensive List of Free Support Groups; Knowledgeable local coordinators who will offer support, information, and resources; Guide to Resources on [employer]; Calendar of events in the  mood disorders community; Latest News and Research; and [facility] [address] for [employer]. Remember - You are not alone; You are not to blame; With help, you will be well. 3-529-312-PPD(9636). WWW. POSTPARTUM. NET   · Writing or talking about death, such as writing suicide notes or talking about guns, knives, or pills. Keep the numbers for these national suicide hotlines: 2-301-818-TALK (7-407.530.4161) and 3-942-SVDQCNA (8-332.652.7424). If you or someone you know talks about suicide or feeling hopeless, get help right away.     Constipation and Hemorrhoids  · Drink plenty of fluids, enough so that your urine is light yellow or clear like water. If you have kidney, heart, or liver disease and have to limit fluids, talk with your doctor before you increase the amount of fluids you drink. · Eat plenty of fiber each day. Have a bran muffin or bran cereal for breakfast, and try eating a piece of fruit for a mid-afternoon snack. · For painful, itchy hemorrhoids, put ice or a cold pack on the area several times a day for 10 minutes at a time. Follow this by putting a warm compress on the area for another 10 to 20 minutes or by sitting in a shallow, warm bath. When should you call for help? Call 911 anytime you think you may need emergency care. For example, call if:  · You are thinking of hurting yourself, your baby, or anyone else. · You passed out (lost consciousness). · You have symptoms of a blood clot in your lung (called a pulmonary embolism). These may include:    · Sudden chest pain. · Trouble breathing. · Coughing up blood. Call your doctor now or seek immediate medical care if:  · You have severe vaginal bleeding. · You are soaking through a pad each hour for 2 or more hours. · Your vaginal bleeding seems to be getting heavier or is still bright red 4 days after delivery. · You are dizzy or lightheaded, or you feel like you may faint. · You are vomiting or cannot keep fluids down. · You have a fever. · You have new or more belly pain. · You pass tissue (not just blood). · Your vaginal discharge smells bad. · Your belly feels tender or full and hard. · Your breasts are continuously painful or red. · You feel sad, anxious, or hopeless for more than a few days. · You have sudden, severe pain in your belly. · You have symptoms of a blood clot in your leg (called a deep vein thrombosis),          such as:  · Pain in your calf, back of the knee, thigh, or groin. · Redness and swelling in your leg or groin.   · You have symptoms of preeclampsia, such as:  · Sudden swelling of your face, hands, or feet. · New vision problems (such as dimness or blurring). · A severe headache. · Your blood pressure is higher than it should be or rises suddenly. · You have new nausea or vomiting. Watch closely for changes in your health, and be sure to contact your doctor if you have any problems. Additional Information:  Learning About Hypertensive Disorders After Childbirth    What is preeclampsia? A woman with preeclampsia has blood pressure that is higher than usual. She may also have other serious symptoms. Preeclampsia can be dangerous. When it is severe, it can cause seizures (eclampsia) or liver or kidney damage. When the liver is affected, some women get HELLP syndrome, a blood-clotting and bleeding problem. HELLP can come on quickly and can be deadly. This is why your doctor checks you and your baby often. Preeclampsia usually occurs after 20 weeks of pregnancy. In rare cases, it is first noted right after childbirth. Most often, it starts near the end of pregnancy and goes away after childbirth. What are the symptoms? Mild preeclampsia usually doesn't cause symptoms. But preeclampsia can cause rapid weight gain and sudden swelling of the hands and face. Severe preeclampsia does cause symptoms. It can cause a very bad headache and trouble seeing and breathing. It also can cause belly pain. You may also urinate less than usual.    If you have new preeclampsia symptoms after you go home from the hospital, call your doctor right away. What can you expect after you have had preeclampsia? In the hospital  After the baby and the placenta are delivered, preeclampsia usually starts to improve. Most women get better in the first few days after childbirth. After having preeclampsia, you still have a risk of seizures for a day or more after childbirth. (Very rarely, seizures happen later on.) So your doctor may have you take magnesium sulfate for a day or more to prevent seizures. You may also take medicine to lower your blood pressure. When you go home  Your blood pressure will most likely return to normal a few days after delivery. Your doctor will want to check your blood pressure sometime in the first week after you leave the hospital.    Some women still have high blood pressure 6 weeks after childbirth. But most return to normal levels over the long term. · Take and record your blood pressure at home if your doctor tells you to. · Learn the importance of the two measures of blood pressure (such as 120 over 80, or 120/80). The first number is the systolic pressure. This is the force of blood on the artery walls as the heart pumps. The second number is the diastolic pressure. This is the force of blood on the artery walls between heartbeats, when the heart is at rest. You have a choice of monitors to use. Manual monitor: You pump up the cuff and use a stethoscope to listen for your  Pulse. · Electronic monitor: The cuff inflates, and a gauge shows your pulse rate. · To take your blood pressure:  · Ask your doctor to check your blood pressure monitor to be sure that it is accurate and that the cuff fits you. Also ask your doctor to watch you use it, to make sure that you are using it right. · You should not eat, use tobacco products, or use medicine known to raise blood pressure (such as some nasal decongestant sprays) before you take your blood pressure. · Avoid taking your blood pressure if you have just exercised or are nervous or upset. Rest at least 15 minutes before you take your blood pressure. · Be safe with medicines. If you take medicine, take it exactly as prescribed. Call your doctor if you think you are having a problem with your medicine. · Do not smoke. Quitting smoking will help lower your blood pressure and improve your baby's growth and health. If you need help quitting, talk to your doctor about stop-smoking programs and medicines.  These can increase your chances of quitting for good. · Eat a balanced and healthy diet that has lots of fruits and vegetables. Long-term health   After you have had preeclampsia, you have a higher-than-average risk of heart disease, stroke, and kidney disease. This may be because the same things that cause preeclampsia also cause heart and kidney disease. To protect your health, work with your doctor on living a heart-healthy lifestyle and getting the checkups you need. Your doctor may also want you to check your blood pressure at home. Follow-up care is a key part of your treatment and safety. Be sure to make and go to all appointments, and call your doctor if you are having problems. It's also a good idea to know your test results and keep a list of the medicines you take. Postpartum Support    PARENTS:  Are you feeling sad or depressed? Is it difficult for you to enjoy yourself? Do you feel more irritable or tense? Do you feel anxious or panicky? Are you having difficulty bonding with your baby? Do you feel as if you are \"out of control\" or \"going crazy\"? Are you worried that you might hurt your baby or yourself? FAMILIES: Do you worry that something is wrong but don't know how to help? Do you think that your partner or spouse is having problems coping? Are you worried that it may never get better? While many women experience some mild mood change or \"the blues\" during or after the birth of a child, 1 in 9 women experience more significant symptoms of depression or anxiety. 1 in 10 Dads become depressed during the first year. Things you can do  Being a good parent includes taking care of yourself. If you take care of yourself, you will be able to take better care of your baby and your family. · Talk to a counselor or healthcare provider who has training in  mood and anxiety problems. · Learn as much as you can about pregnancy and postpartum depression and anxiety.   · Get support from family and friends. Ask for help when you need it. · Join a support group in your area or online. · Keep active by walking, stretching or whatever form of exercise helps you to feel better. · Get enough rest and time for yourself. · Eat a healthy diet. · Don't give up! It may take more than one try to get the right help you need. These are general instructions for a healthy lifestyle:    No smoking/ No tobacco products/ Avoid exposure to second hand smoke    Surgeon General's Warning:  Quitting smoking now greatly reduces serious risk to your health. Obesity, smoking, and sedentary lifestyle greatly increases your risk for illness    A healthy diet, regular physical exercise & weight monitoring are important for maintaining a healthy lifestyle    Recognize signs and symptoms of STROKE:    F-face looks uneven    A-arms unable to move or move unevenly    S-speech slurred or non-existent    T-time-call 911 as soon as signs and symptoms begin - DO NOT go       back to bed or wait to see if you get better - TIME IS BRAIN. I have had the opportunity to make my options or choices for discharge. I have received and understand these instructions.     Patient Education

## 2021-10-05 LAB
CHLAMYDIA, EXTERNAL: NEGATIVE
HBSAG, EXTERNAL: NEGATIVE
HEPATITIS C AB,   EXT: NEGATIVE
HIV, EXTERNAL: NON REACTIVE
N. GONORRHEA, EXTERNAL: NEGATIVE
RUBELLA, EXTERNAL: NORMAL
T. PALLIDUM, EXTERNAL: NON REACTIVE

## 2021-10-25 LAB — ANTIBODY SCREEN, EXTERNAL: NEGATIVE

## 2022-03-19 PROBLEM — J45.20 MILD INTERMITTENT ASTHMA WITHOUT COMPLICATION: Status: ACTIVE | Noted: 2020-03-24

## 2022-03-19 PROBLEM — Z86.79 H/O: HYPERTENSION: Status: ACTIVE | Noted: 2020-03-24

## 2022-03-20 PROBLEM — Z37.9 NORMAL LABOR: Status: ACTIVE | Noted: 2020-03-24

## 2022-04-22 LAB — GRBS, EXTERNAL: NEGATIVE

## 2022-05-15 ENCOUNTER — HOSPITAL ENCOUNTER (INPATIENT)
Age: 37
LOS: 3 days | Discharge: HOME | End: 2022-05-18
Attending: OBSTETRICS & GYNECOLOGY | Admitting: OBSTETRICS & GYNECOLOGY
Payer: COMMERCIAL

## 2022-05-15 ENCOUNTER — HOSPITAL ENCOUNTER (EMERGENCY)
Age: 37
Discharge: HOME | End: 2022-05-15
Payer: COMMERCIAL

## 2022-05-15 VITALS
HEART RATE: 99 BPM | SYSTOLIC BLOOD PRESSURE: 132 MMHG | TEMPERATURE: 99.3 F | RESPIRATION RATE: 16 BRPM | DIASTOLIC BLOOD PRESSURE: 73 MMHG

## 2022-05-15 DIAGNOSIS — Z3A.40 40 WEEKS GESTATION OF PREGNANCY: Primary | ICD-10-CM

## 2022-05-15 DIAGNOSIS — R10.9 ABDOMINAL PAIN DURING PREGNANCY IN THIRD TRIMESTER: ICD-10-CM

## 2022-05-15 DIAGNOSIS — Z36.89 NST (NON-STRESS TEST) REACTIVE: ICD-10-CM

## 2022-05-15 DIAGNOSIS — O26.893 ABDOMINAL PAIN DURING PREGNANCY IN THIRD TRIMESTER: ICD-10-CM

## 2022-05-15 PROCEDURE — 99282 EMERGENCY DEPT VISIT SF MDM: CPT

## 2022-05-15 PROCEDURE — 75810000275 HC EMERGENCY DEPT VISIT NO LEVEL OF CARE

## 2022-05-15 PROCEDURE — 0KQM0ZZ REPAIR PERINEUM MUSCLE, OPEN APPROACH: ICD-10-PCS | Performed by: ADVANCED PRACTICE MIDWIFE

## 2022-05-15 PROCEDURE — 65270000029 HC RM PRIVATE

## 2022-05-15 PROCEDURE — 36415 COLL VENOUS BLD VENIPUNCTURE: CPT

## 2022-05-15 PROCEDURE — 85025 COMPLETE CBC W/AUTO DIFF WBC: CPT

## 2022-05-15 PROCEDURE — 75410000002 HC LABOR FEE PER 1 HR

## 2022-05-15 PROCEDURE — 80053 COMPREHEN METABOLIC PANEL: CPT

## 2022-05-15 RX ORDER — SODIUM CHLORIDE, SODIUM LACTATE, POTASSIUM CHLORIDE, CALCIUM CHLORIDE 600; 310; 30; 20 MG/100ML; MG/100ML; MG/100ML; MG/100ML
125 INJECTION, SOLUTION INTRAVENOUS CONTINUOUS
Status: DISCONTINUED | OUTPATIENT
Start: 2022-05-16 | End: 2022-05-16

## 2022-05-15 RX ORDER — OXYTOCIN/RINGER'S LACTATE 30/500 ML
87.3 PLASTIC BAG, INJECTION (ML) INTRAVENOUS AS NEEDED
Status: DISCONTINUED | OUTPATIENT
Start: 2022-05-15 | End: 2022-05-16

## 2022-05-15 RX ORDER — MAG HYDROX/ALUMINUM HYD/SIMETH 200-200-20
30 SUSPENSION, ORAL (FINAL DOSE FORM) ORAL
Status: DISCONTINUED | OUTPATIENT
Start: 2022-05-15 | End: 2022-05-16

## 2022-05-15 RX ORDER — NALOXONE HYDROCHLORIDE 0.4 MG/ML
0.4 INJECTION, SOLUTION INTRAMUSCULAR; INTRAVENOUS; SUBCUTANEOUS AS NEEDED
Status: DISCONTINUED | OUTPATIENT
Start: 2022-05-15 | End: 2022-05-16

## 2022-05-15 RX ORDER — SODIUM CHLORIDE 0.9 % (FLUSH) 0.9 %
5-40 SYRINGE (ML) INJECTION AS NEEDED
Status: DISCONTINUED | OUTPATIENT
Start: 2022-05-15 | End: 2022-05-16

## 2022-05-15 RX ORDER — SODIUM CHLORIDE 0.9 % (FLUSH) 0.9 %
5-40 SYRINGE (ML) INJECTION EVERY 8 HOURS
Status: DISCONTINUED | OUTPATIENT
Start: 2022-05-16 | End: 2022-05-16

## 2022-05-15 RX ORDER — LIDOCAINE HYDROCHLORIDE 10 MG/ML
10 INJECTION INFILTRATION; PERINEURAL AS NEEDED
Status: DISCONTINUED | OUTPATIENT
Start: 2022-05-15 | End: 2022-05-16

## 2022-05-15 RX ORDER — OXYTOCIN/RINGER'S LACTATE 30/500 ML
10 PLASTIC BAG, INJECTION (ML) INTRAVENOUS AS NEEDED
Status: DISCONTINUED | OUTPATIENT
Start: 2022-05-15 | End: 2022-05-16

## 2022-05-15 NOTE — PROGRESS NOTES
0100 Pt presents to [facility] with complaint of possible panic attack and sudden large movement of the baby for 15 mins. Pt wanted to come in and see if this is labor and to check on baby. 0120 HERMELINDO Cordero to bedside to assess pt. SVE 3/50/-2 and firm. Does not believe that this is labor. Pt desires to go home. 0122 Pt sitting up at this time to reposition. [address] is for pt to d/c home and given labor precautions at this time. Awaiting registration. 0207 Pt left L&D at this time.

## 2022-05-15 NOTE — DISCHARGE INSTRUCTIONS
Patient Education        Early Stage of Labor at Home: Care Instructions  Overview     If you came to the hospital while in early labor, your doctor may ask you to labor at home until your contractions are stronger. Many women stay at home during early labor. This is often the longest part of the birthing process. It may last up to 2 to 3 days. Contractions are mild to moderate and shorter (about 30 to 45 seconds). You can usually keep talking during them. Contractions may also be irregular, about 5 to 20 minutes apart. They may even stop for a while. It helps to stay as relaxed as you can during this time. You can spend some or all of your early labor at home or anywhere else you may be comfortable. If you live far from the hospital or birthing center, you may want to think about going somewhere nearby so you can get back to the hospital quickly. For some women, there may be benefits to staying home during early labor, such as avoiding medicines or procedures. As labor progresses, you'll shift from early labor to active labor. During this time, contractions get more intense. They occur more often, about every 2 to 3 minutes. They also last longer, about 50 to 70 seconds. You will feel them even when you change positions and walk or move around. It may be hard to tell if you are in active labor. If you aren't sure, call your doctor or midwife. As your labor progresses, check in with your doctor or midwife about when to come back to the hospital or birthing center. You may have special instructions if your water broke or you tested positive for group B strep. Follow-up care is a key part of your treatment and safety. Be sure to make and go to all appointments, and call your doctor if you are having problems. It's also a good idea to know your test results and keep a list of the medicines you take. How can you care for yourself at home? · Get support.  Having a support person with you from early labor until after childbirth can have a positive effect on childbirth. · Find distractions. During early labor, you can walk, play cards, watch TV, or listen to music to help take your mind off your contractions. · Ask your partner, labor , or  for a massage. Shoulder and low back massage during contractions may ease your pain. Strong massage of the back muscles (counterpressure) during contractions may help relieve the pain of back labor. Tell your labor  exactly where to push and how hard to push. · Use imagery. This means using your imagination to decrease your pain. For instance, to help manage pain, picture your contractions as waves rolling over you. Picture a peaceful place, such as a beach or mountain stream, to help you relax between contractions. · Change positions during labor. Walking, kneeling, or sitting on a big rubber ball (birth ball) are good options. · Use focused breathing techniques. Breathing in a rhythm can distract you from pain. · Take a warm shower or bath. Warm water may ease pain and stress. When should you call for help? Call 911  anytime you think you may need emergency care. For example, call if:    · You passed out (lost consciousness).     · You have a seizure.     · You have severe vaginal bleeding.     · You have severe pain in your belly or pelvis that doesn't get better between contractions.     · You have had fluid gushing or leaking from your vagina and you know or think the umbilical cord is bulging into your vagina. If this happens, immediately get down on your knees so your rear end (buttocks) is higher than your head. This will decrease the pressure on the cord until help arrives. Call your doctor now or seek immediate medical care if:    · You have new or worse signs of preeclampsia, such as:  ? Sudden swelling of your face, hands, or feet. ? New vision problems (such as dimness, blurring, or seeing spots).   ? A severe headache.     · You have any vaginal bleeding.     · You have belly pain or cramping.     · You have a fever.     · You have had regular contractions (with or without pain) for an hour. This means that you have 8 or more within 1 hour or 4 or more in 20 minutes after you change your position and drink fluids.     · You have a sudden release of fluid from your vagina.     · You have low back pain or pelvic pressure that does not go away.     · You notice that your baby has stopped moving or is moving much less than normal.   Watch closely for changes in your health, and be sure to contact your doctor if you have any problems. Where can you learn more? Go to http://www.gray.com/  Enter [id number] in the search box to learn more about \"Early Stage of Labor at Home: Care Instructions. \"  Current as of: June 16, 2021               Content Version: 13.2  © 2259-5518 [employer]. Care instructions adapted under license by [employer] (which disclaims liability or warranty for this information). If you have questions about a medical condition or this instruction, always ask your healthcare professional. [address] any warranty or liability for your use of this information.

## 2022-05-15 NOTE — ED PROVIDER NOTES
Lizz Jackson is a 30 yo  at 40w0d with an KULDIP of 5/15/22. She presents to the [facility] for evaluation after experiencing abd tighten for about 15 minutes at home. This occurred about an hour ago. Reports it was painless, but noticeable. She palpated her abd during it and could tell it was tighter than normal. After it stopped she felt normal fetal movement and has not experienced it again. Pt reports she has been francisco irregularly for several days, but they remain mild to moderate in intensity (5/10 on the pain scale). Reports some bloody show after SVE in office a couple days ago, but no satish vaginal bleeding. Denies LOF. Otherwise, feeling well, but stressed about impending labor. Prenatal care has been received at [facility] with Dr Boris Amezquita. Pregnancy has been complicated by AMA with normal genetic screening. Pt is obese, BMI 35.5, with normal glucose screening during this pregnancy. 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 Pt also has a history of asthma, rare inhaler use. Pt also with history of anxiety, not currently taking any medication for.        Past Medical History:   Diagnosis Date    Asthma     Essential hypertension     Phlebitis and thrombophlebitis     during pregnancy       Past Surgical History:   Procedure Laterality Date    DELIVERY VAGINAL  3/25/2020         HX OTHER SURGICAL      wisdom teeth    HX OTHER SURGICAL      \"lung surgery\" as a premature infant         Family History:   Problem Relation Age of Onset    Hypertension Mother     Cancer Father        Social History     Socioeconomic History    Marital status:      Spouse name: Not on file    Number of children: Not on file    Years of education: Not on file    Highest education level: Not on file   Occupational History    Not on file   Tobacco Use    Smoking status: Never Smoker    Smokeless tobacco: Never Used   Substance and Sexual Activity    Alcohol use: Never    Drug use: Never  Sexual activity: Yes   Other Topics Concern     Service Not Asked    Blood Transfusions Not Asked    Caffeine Concern Not Asked    Occupational Exposure Not Asked    Hobby Hazards Not Asked    Sleep Concern Not Asked    Stress Concern Not Asked    Weight Concern Not Asked    Special Diet Not Asked    Back Care Not Asked    Exercise Not Asked    Bike Helmet Not Asked   [address] Not Asked    Self-Exams Not Asked   Social History Narrative    Not on file     Social Determinants of Health     Financial Resource Strain:     Difficulty of Paying Living Expenses: Not on file   Food Insecurity:     Worried About Running Out of Food in the Last Year: Not on file    Leisa of Food in the Last Year: Not on file   Transportation Needs:     Lack of Transportation (Medical): Not on file    Lack of Transportation (Non-Medical): Not on file   Physical Activity:     Days of Exercise per Week: Not on file    Minutes of Exercise per Session: Not on file   Stress:     Feeling of Stress : Not on file   Social Connections:     Frequency of Communication with Friends and Family: Not on file    Frequency of Social Gatherings with Friends and Family: Not on file    Attends Evangelical Services: Not on file    Active Member of [address] [place] [address] [employer] or Organizations: Not on file    Attends Club or Organization Meetings: Not on file    Marital Status: Not on file   Intimate Partner Violence:     Fear of Current or Ex-Partner: Not on file    Emotionally Abused: Not on file    Physically Abused: Not on file    Sexually Abused: Not on file   Housing Stability:     Unable to Pay for Housing in the Last Year: Not on file    Number of [place] in the Last Year: Not on file    Unstable Housing in the Last Year: Not on file         ALLERGIES: Patient has no known allergies. Review of Systems   Constitutional: Negative. HENT: Negative. Eyes: Negative. Respiratory: Negative. Cardiovascular: Negative. Gastrointestinal:        Abdominal tightening   Endocrine: Negative. Genitourinary: Positive for vaginal bleeding. Musculoskeletal: Negative. Skin: Negative. Allergic/Immunologic: Negative. Neurological: Negative. Hematological: Negative. Psychiatric/Behavioral: Negative. Vitals:    05/15/22 0111   Pulse: 90   Resp: 16   Temp: 99.3 °F (37.4 °C)            Physical Exam  Vitals and nursing note reviewed. Exam conducted with a chaperone present. Constitutional:       Appearance: Normal appearance. She is obese. HENT:      Head: Normocephalic and atraumatic. Nose: Nose normal.      Mouth/Throat:      Mouth: Mucous membranes are moist.   Eyes:      Extraocular Movements: Extraocular movements intact. Cardiovascular:      Rate and Rhythm: Normal rate and regular rhythm. Pulses: Normal pulses. Heart sounds: Normal heart sounds. Pulmonary:      Effort: Pulmonary effort is normal.      Breath sounds: Normal breath sounds. Abdominal:      General: There is no distension. Palpations: Abdomen is soft. Tenderness: There is no abdominal tenderness. There is no guarding or rebound. Comments: Gravid, abd soft, ctx mild to palpation, soft between   Genitourinary:     Comments: SVE: 3/50/-2 (unchanged from office), intact, vertex  Musculoskeletal:         General: Normal range of motion. Cervical back: Normal range of motion and neck supple. Skin:     General: Skin is warm and dry. Capillary Refill: Capillary refill takes less than 2 seconds. Neurological:      General: No focal deficit present. Mental Status: She is alert and oriented to person, place, and time. Mental status is at baseline. Psychiatric:         Mood and Affect: Mood normal.         Behavior: Behavior normal.         Thought Content:  Thought content normal.         Judgment: Judgment normal.      NST: Monitored for 20 minutes, reactive, cat 1, baseline 130, positive accels, no decels, moderate variability, ctx irregular, mild to palpation, resting tone soft    Patient Vitals for the past 4 hrs:   Temp Pulse Resp BP   05/15/22 0122  99  132/73   05/15/22 0111 99.3 °F (37.4 °C) 90 12          MDM  Number of Diagnoses or Management Options     Amount and/or Complexity of Data Reviewed  Decide to obtain previous medical records or to obtain history from someone other than the patient: yes  Review and summarize past medical records: yes  Independent visualization of images, tracings, or specimens: yes (NST)    Risk of Complications, Morbidity, and/or Mortality  Presenting problems: moderate  Diagnostic procedures: moderate  Management options: moderate    Critical Care  Total time providing critical care: < 30 minutes    Patient Progress  Patient progress: stable    ED Course as of 05/15/22 0114   Sun May 15, 2022   0110 Admit to [facility]  NST  SVE 3/50/-2, intact, unchanged from office. Discussed option to wait two hours and then have repeat SVE or to go home and await a more active phase of labor. Pt desires to discharge home to return when in active labor. Desires a low intervention delivery. Discussed in detail s/s of labor, ctx, vb, lof and reviewed [address]. Return with any concerns.    Discharged home, return to [facility] PR[facility]  Next next OB appt if undelivered [LA]      ED Course User Index  [LA] Viviane Moore CNM

## 2022-05-16 LAB
ALBUMIN SERPL-MCNC: 2.6 G/DL (ref 3.5–5)
ALBUMIN/GLOB SERPL: 0.7 {RATIO} (ref 1.1–2.2)
ALP SERPL-CCNC: 170 U/L (ref 45–117)
ALT SERPL-CCNC: 34 U/L (ref 12–78)
ANION GAP SERPL CALC-SCNC: 7 MMOL/L (ref 5–15)
AST SERPL-CCNC: 42 U/L (ref 15–37)
BASOPHILS # BLD: 0 K/UL (ref 0–0.1)
BASOPHILS NFR BLD: 0 % (ref 0–1)
BILIRUB SERPL-MCNC: 0.2 MG/DL (ref 0.2–1)
BUN SERPL-MCNC: 7 MG/DL (ref 6–20)
BUN/CREAT SERPL: 13 (ref 12–20)
CALCIUM SERPL-MCNC: 8.6 MG/DL (ref 8.5–10.1)
CHLORIDE SERPL-SCNC: 106 MMOL/L (ref 97–108)
CO2 SERPL-SCNC: 24 MMOL/L (ref 21–32)
CREAT SERPL-MCNC: 0.56 MG/DL (ref 0.55–1.02)
CREAT UR-MCNC: 118 MG/DL
DIFFERENTIAL METHOD BLD: ABNORMAL
EOSINOPHIL # BLD: 0.1 K/UL (ref 0–0.4)
EOSINOPHIL NFR BLD: 1 % (ref 0–7)
ERYTHROCYTE [DISTWIDTH] IN BLOOD BY AUTOMATED COUNT: 14.9 % (ref 11.5–14.5)
GLOBULIN SER CALC-MCNC: 4 G/DL (ref 2–4)
GLUCOSE SERPL-MCNC: 97 MG/DL (ref 65–100)
HCT VFR BLD AUTO: 38 % (ref 35–47)
HGB BLD-MCNC: 12.2 G/DL (ref 11.5–16)
IMM GRANULOCYTES # BLD AUTO: 0.1 K/UL (ref 0–0.04)
IMM GRANULOCYTES NFR BLD AUTO: 0 % (ref 0–0.5)
LYMPHOCYTES # BLD: 1.9 K/UL (ref 0.8–3.5)
LYMPHOCYTES NFR BLD: 16 % (ref 12–49)
MCH RBC QN AUTO: 28.8 PG (ref 26–34)
MCHC RBC AUTO-ENTMCNC: 32.1 G/DL (ref 30–36.5)
MCV RBC AUTO: 89.8 FL (ref 80–99)
MONOCYTES # BLD: 0.6 K/UL (ref 0–1)
MONOCYTES NFR BLD: 5 % (ref 5–13)
NEUTS SEG # BLD: 9.1 K/UL (ref 1.8–8)
NEUTS SEG NFR BLD: 78 % (ref 32–75)
NRBC # BLD: 0 K/UL (ref 0–0.01)
NRBC BLD-RTO: 0 PER 100 WBC
PLATELET # BLD AUTO: 205 K/UL (ref 150–400)
PMV BLD AUTO: 11.1 FL (ref 8.9–12.9)
POTASSIUM SERPL-SCNC: 4.1 MMOL/L (ref 3.5–5.1)
PROT SERPL-MCNC: 6.6 G/DL (ref 6.4–8.2)
PROT UR-MCNC: 27 MG/DL (ref 0–11.9)
PROT/CREAT UR-RTO: 0.2
RBC # BLD AUTO: 4.23 M/UL (ref 3.8–5.2)
SODIUM SERPL-SCNC: 137 MMOL/L (ref 136–145)
WBC # BLD AUTO: 11.7 K/UL (ref 3.6–11)

## 2022-05-16 PROCEDURE — 84156 ASSAY OF PROTEIN URINE: CPT

## 2022-05-16 PROCEDURE — 75410000000 HC DELIVERY VAGINAL/SINGLE

## 2022-05-16 PROCEDURE — 65410000002 HC RM PRIVATE OB

## 2022-05-16 PROCEDURE — 75410000003 HC RECOV DEL/VAG/CSECN EA 0.5 HR

## 2022-05-16 PROCEDURE — 74011250637 HC RX REV CODE- 250/637: Performed by: ADVANCED PRACTICE MIDWIFE

## 2022-05-16 RX ORDER — OXYCODONE AND ACETAMINOPHEN 5; 325 MG/1; MG/1
1 TABLET ORAL
Status: DISCONTINUED | OUTPATIENT
Start: 2022-05-16 | End: 2022-05-18 | Stop reason: HOSPADM

## 2022-05-16 RX ORDER — DOCUSATE SODIUM 100 MG/1
100 CAPSULE, LIQUID FILLED ORAL
Status: DISCONTINUED | OUTPATIENT
Start: 2022-05-16 | End: 2022-05-18 | Stop reason: HOSPADM

## 2022-05-16 RX ORDER — NALOXONE HYDROCHLORIDE 0.4 MG/ML
0.4 INJECTION, SOLUTION INTRAMUSCULAR; INTRAVENOUS; SUBCUTANEOUS AS NEEDED
Status: DISCONTINUED | OUTPATIENT
Start: 2022-05-16 | End: 2022-05-18 | Stop reason: HOSPADM

## 2022-05-16 RX ORDER — OXYTOCIN/RINGER'S LACTATE 30/500 ML
10 PLASTIC BAG, INJECTION (ML) INTRAVENOUS AS NEEDED
Status: DISCONTINUED | OUTPATIENT
Start: 2022-05-16 | End: 2022-05-18 | Stop reason: HOSPADM

## 2022-05-16 RX ORDER — OXYTOCIN/RINGER'S LACTATE 30/500 ML
87.3 PLASTIC BAG, INJECTION (ML) INTRAVENOUS AS NEEDED
Status: DISCONTINUED | OUTPATIENT
Start: 2022-05-16 | End: 2022-05-18 | Stop reason: HOSPADM

## 2022-05-16 RX ORDER — ACETAMINOPHEN 325 MG/1
650 TABLET ORAL
Status: DISCONTINUED | OUTPATIENT
Start: 2022-05-16 | End: 2022-05-18 | Stop reason: HOSPADM

## 2022-05-16 RX ORDER — IBUPROFEN 600 MG/1
600 TABLET ORAL
Status: DISCONTINUED | OUTPATIENT
Start: 2022-05-16 | End: 2022-05-18 | Stop reason: HOSPADM

## 2022-05-16 RX ORDER — HYDROCORTISONE ACETATE PRAMOXINE HCL 2.5; 1 G/100G; G/100G
CREAM TOPICAL AS NEEDED
Status: DISCONTINUED | OUTPATIENT
Start: 2022-05-16 | End: 2022-05-18 | Stop reason: HOSPADM

## 2022-05-16 RX ADMIN — IBUPROFEN 600 MG: 600 TABLET, FILM COATED ORAL at 04:02

## 2022-05-16 RX ADMIN — IBUPROFEN 600 MG: 600 TABLET, FILM COATED ORAL at 23:44

## 2022-05-16 RX ADMIN — IBUPROFEN 600 MG: 600 TABLET, FILM COATED ORAL at 10:35

## 2022-05-16 RX ADMIN — IBUPROFEN 600 MG: 600 TABLET, FILM COATED ORAL at 16:17

## 2022-05-16 NOTE — PROGRESS NOTES
2308: TRANSFER - IN REPORT:    Verbal report received from CRISTIN Baker RN(name) on Adeline Arellano  being received from [facility] (unit) for routine progression of care      Report consisted of patients Situation, Background, Assessment and   Recommendations(SBAR). Information from the following report(s) SBAR was reviewed with the receiving nurse. Opportunity for questions and clarification was provided. Assessment completed upon patients arrival to unit and care assumed. 0209: SVE 7-/0    0235: SVE 8/0 by Estephanie Baltazar, [address] [place] [address] Avenue: Joey Patterson at bedside for delivery    0255:     0530: TRANSFER - OUT REPORT:    Verbal report given to JAKE Olmos (name) on Adeline Arellano  being transferred to [facility] (unit) for routine progression of care       Report consisted of patients Situation, Background, Assessment and   Recommendations(SBAR). Information from the following report(s) SBAR, Intake/Output, MAR and Recent Results was reviewed with the receiving nurse. Lines:   Peripheral IV 05/15/22 Anterior;Distal;Left Forearm (Active)   Site Assessment Clean, dry, & intact 05/15/22 2352   Phlebitis Assessment 0 05/15/22 235   Infiltration Assessment 0 05/15/22 2352   Dressing Status Clean, dry, & intact 05/15/22 2352   Dressing Type Transparent;Tape 05/15/22 2352   Hub Color/Line Status Pink 05/15/22 2352        Opportunity for questions and clarification was provided.       Patient transported with:   Registered Nurse

## 2022-05-16 NOTE — PROGRESS NOTES
2234 Patient arrived to [facility] with complaints of contractions that have been going on all day and are now every 5 minutes. 2238 PRIYA Hicks notified of patients arrival.    2243 PRIYA Hicks at the bedside. Reviewing FHT and discussing plan of care. 2249 SVE 4/80/-2. Plan to admit. 2300 Bedside and Verbal shift change report given to CRISTIN Lay RN by Ken Christopher. [facility], RN. Report included the following information SBAR, Intake/Output and MAR.     2304 Patient to LD10.

## 2022-05-16 NOTE — L&D DELIVERY NOTE
Delivery Summary    Patient: Steven Banda MRN: [id number]  SSN: [id number]    YOB: 1985  Age: 29 y.o. Sex: female       Information for the patient's :  Rebecca Milian [[id number]]       Labor Events:    Labor: No    Steroids:     Cervical Ripening Date/Time:       Cervical Ripening Type: None   Antibiotics During Labor: No   Rupture Identifier:      Rupture Date/Time:       Rupture Type:     Amniotic Fluid Volume:      Amniotic Fluid Description: Clear    Amniotic Fluid Odor:      Induction: None       Induction Date/Time:        Indications for Induction:      Augmentation: None   Augmentation Date/Time:      Indications for Augmentation:     Labor complications: None       Additional complications:        Delivery Events:  Indications For Episiotomy:     Episiotomy: None   Perineal Laceration(s): 2nd   Repaired: Yes   Periurethral Laceration Location:      Repaired:     Labial Laceration Location:     Repaired:     Sulcal Laceration Location:     Repaired:     Vaginal Laceration Location:     Repaired:     Cervical Laceration Location:     Repaired:     Repair Suture: Vicryl 3-0   Number of Repair Packets: 1   Estimated Blood Loss (ml):  ml   Quantitative Blood Loss (ml)                Delivery Date: 2022    Delivery Time: 2:55 AM  Delivery Type: Vaginal, Spontaneous  Sex:  Male    Gestational Age: 44w3d   Delivery Clinician:  Sherry Gallego  Living Status: Living   Delivery Location: L&D            APGARS  One minute Five minutes Ten minutes   Skin color: 1   1        Heart rate: 2   2        Grimace: 2   2        Muscle tone: 2   2        Breathin   2        Totals: 9   9            Presentation: Vertex    Position:   Occiput Anterior  Resuscitation Method:  Suctioning-bulb; Tactile Stimulation     Meconium Stained: Terminal      Cord Information: 3 Vessels  Complications: None;Nuchal Cord Without Compressions  Cord around: head  Delayed cord clamping? Yes  Cord clamped date/time:2022  2:56 AM  Disposition of Cord Blood: Discard    Blood Gases Sent?: No    Placenta:  Date/Time: 2022  3:03 AM  Removal: Spontaneous      Appearance: Normal;Intact     [place] Measurements:  Birth Weight:        Birth Length:        Head Circumference:        Chest Circumference:       Abdominal Girth: Other Providers:   Alexandrea Mobley, Primary Nurse;Primary [place] Nurse;Midwife           Group B Strep:   Lab Results   Component Value Date/Time    GrBStrep, External Negative 2020 12:00 AM     Information for the patient's :  Onofre Weber [[id number]]   No results found for: ABORH, PCTABR, PCTDIG, BILI, ABORHEXT, ABORH     No results for input(s): PCO2CB, PO2CB, HCO3I, SO2I, IBD, PTEMPI, SPECTI, PHICB, ISITE, IDEV, IALLEN in the last 72 hours. Called to patient's room as patient starting to feel pressure. SVE 8-9/100/0, intact. Patient considering AROM. On next contraction felt strong urge to push and big gush of fluid, now c/c/2 at 0250. She pushed for approximately 5 minutes to TSVD of VMI at 0255. Loose nuchal x1 noted however shoulders and body delivered quickly and with ease, so infant somersaulted through cord and then unwrapped. Infant lifted to maternal abdomen, towel dried, vigorous cry. Cord double clamped by me and then cut by FOB after pulsations ceased. Apgars 9 at one minute and 9 at five minutes. Placenta spontaneous at 0303. Examined and was found to be intact. 3 vessel cord. Fundus firm to massage. Patient strongly desires to avoid pitocin, so declines at this point. Wants to try to breastfeeding to help. Reviewed indications as well as active management- patient agreeable if indicated by bleeding, but declines currently. Will monitor closely and give 10units IM pitocin if needed. QBL 227mL.  Vulva, vagina and perineum inspected and found to have 2nd degree perineal laceration that was repaired with 3-0 Vicryl CT under 10cc 1% Lidocaine to good approximation and hemostasis. Mom and baby doing well. Baby remains skin to skin with mom and currently breastfeeding.

## 2022-05-16 NOTE — ROUTINE PROCESS
Bedside shift change report given to Vira Cardenas RN (oncoming nurse) by Graciela Jorgensen RN (offgoing nurse). Report included the following information SBAR.

## 2022-05-16 NOTE — H&P
Labor and Delivery History and Physical  2022    Patient is a 29 y.o. Mati Tucker at 40w0d with jay 5/15/22 who is admitted to L&D for labor confirmed in the [facility]. She initially presents to the [facility] with c/o contractions at 2234. She reports contractions started around midnight and are now about q5 minutes. She reports good FM. Denies VB, LOF, n/v/d, fever, cough, sore throat, SOB/difficulty breathing, loss of taste/smell, exposure to anyone with COVID, h/a, changes in vision, RUQ/epigastric pain.       She reports prenatal care with Dr. 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 GHTN, although nothing noted about that hx on prenatal records         PNC: Blood type: A            RH: pos            Hep B: negative            Rubella: immune            GBS status: negative            HIV: non reactive            RPR/TPA/VDRL: non reactive            GC/CT: negative            HSV serology: not noted on prenatal records, but patient denies any hx of HSV       OBHX:   OB History        3    Para   1    Term   1            AB        Living   1       SAB        IAB        Ectopic        Molar        Multiple   0    Live Births   1                PMH:   Past Medical History:   Diagnosis Date    Anxiety     Asthma     Essential hypertension     Phlebitis and thrombophlebitis     during pregnancy       PSH:   Past Surgical History:   Procedure Laterality Date    DELIVERY VAGINAL  3/25/2020         HX OTHER SURGICAL      wisdom teeth    HX OTHER SURGICAL      \"lung surgery\" as a premature infant       OB/GYN:  40w0d with jay 5/15/22. See above    Meds:   Prior to Admission Medications   Prescriptions Last Dose Informant Patient Reported?  Taking?   ibuprofen (MOTRIN) 600 mg tablet   No No   Sig: Take 1 Tab by mouth every six (6) hours as needed for Pain.   prenatal vit-iron fumarate-fa 27 mg iron- 0.8 mg tab tablet   Yes No   Sig: Take 1 Tab by mouth daily. Facility-Administered Medications: None       Allergies: No Known Allergies    Pertinent ROS: Review of Systems - Negative except noted in HPI  Constitutional: Negative for chills and fever. Eyes: Negative for visual disturbance. Respiratory: Negative for cough and shortness of breath. Cardiovascular: Negative for chest pain and leg swelling. Gastrointestinal: Positive for abdominal pain. Negative for diarrhea, nausea and vomiting. Contractions   Genitourinary: Negative for vaginal bleeding and vaginal discharge. Musculoskeletal: Negative for gait problem. Neurological: Negative for speech difficulty and headaches. All other systems reviewed and are negative.     [address]:   Family History   Problem Relation Age of Onset    Hypertension Mother     Cancer Father        SH:   Social History     Socioeconomic History    Marital status:      Spouse name: Not on file    Number of children: Not on file    Years of education: Not on file    Highest education level: Not on file   Occupational History    Not on file   Tobacco Use    Smoking status: Never Smoker    Smokeless tobacco: Never Used   Vaping Use    Vaping Use: Never used   Substance and Sexual Activity    Alcohol use: Never    Drug use: Never    Sexual activity: Yes   Other Topics Concern     Service Not Asked    Blood Transfusions Not Asked    Caffeine Concern Not Asked    Occupational Exposure Not Asked    Hobby Hazards Not Asked    Sleep Concern Not Asked    Stress Concern Not Asked    Weight Concern Not Asked    Special Diet Not Asked    Back Care Not Asked    Exercise Not Asked    Bike Helmet Not Asked   [address] Not Asked    Self-Exams Not Asked   Social History Narrative    Not on file     Social Determinants of Health     Financial Resource Strain:     Difficulty of Paying Living Expenses: Not on file   Food Insecurity:     Worried About Running Out of Food in the Last Year: Not on file    Ran Out of Food in the Last Year: Not on file   Transportation Needs:     Lack of Transportation (Medical): Not on file    Lack of Transportation (Non-Medical): Not on file   Physical Activity:     Days of Exercise per Week: Not on file    Minutes of Exercise per Session: Not on file   Stress:     Feeling of Stress : Not on file   Social Connections:     Frequency of Communication with Friends and Family: Not on file    Frequency of Social Gatherings with Friends and Family: Not on file    Attends Druze Services: Not on file    Active Member of [address] [place] [address] [employer] or Organizations: Not on file    Attends Club or Organization Meetings: Not on file    Marital Status: Not on file   Intimate Partner Violence:     Fear of Current or Ex-Partner: Not on file    Emotionally Abused: Not on file    Physically Abused: Not on file    Sexually Abused: Not on file   Housing Stability:     Unable to Pay for Housing in the Last Year: Not on file    Number of [place] in the Last Year: Not on file    Unstable Housing in the Last Year: Not on file   Denies tobacco, alcohol, illicits  Denies hx STIs    OBJECTIVE:    Temp (24hrs), Av.7 °F (37.1 °C), Min:98.2 °F (36.8 °C), Max:99.3 °F (37.4 °C)    Visit Vitals  /79   Pulse 93   Temp 98.2 °F (36.8 °C)   Resp 16   Ht 5' 4\" (1.626 m)   Wt 95.7 kg (211 lb)   SpO2 99%   Breastfeeding No   BMI 36.22 kg/m²       Physical Exam  Vitals and nursing note reviewed. Exam conducted with a chaperone present. Constitutional:       General: She is awake. She is not in acute distress. Appearance: Normal appearance. She is well-developed, well-groomed and normal weight. HENT:      Head: Normocephalic. Nose: Nose normal.   Cardiovascular:      Rate and Rhythm: Normal rate and regular rhythm. Pulses: Normal pulses.    Pulmonary: Effort: Pulmonary effort is normal. No respiratory distress. Abdominal:      Tenderness: There is no abdominal tenderness. Comments: Gravid c/w 40w  FHTs: 135s, +accels, neg decels, moderate variabiltiy  [place]: q2-4 minutes, moderate     Genitourinary:     General: Normal vulva. Exam position: Supine. Labia:         Right: No lesion. Left: No lesion. Vagina: Normal.      Cervix: Dilated. Uterus: Normal. Enlarged. Rectum: Normal.      Comments: SVE 4/80/-2, vtx  Membranes intact  Pelvis feels adequate  No lesions noted  Musculoskeletal:         General: Normal range of motion. Cervical back: Normal range of motion. Right lower le+ Edema present. Left lower le+ Edema present. Skin:     General: Skin is warm and dry. Neurological:      Mental Status: She is alert and oriented to person, place, and time. Gait: Gait is intact. Psychiatric:         Mood and Affect: Mood normal.         Speech: Speech normal.         Behavior: Behavior normal. Behavior is cooperative. Thought Content:  Thought content normal.         Cognition and Memory: Cognition and memory normal.         Judgment: Judgment normal.     Impression:  at 40w0d IUP  Labor  Cat 1 tracing  GBS negative  AMA  Obesity  Anxiety        Plan:  Admit to L&D  Initial BP mildly elevated- will repeat if remains elevated, will send preE labs  Review and sign consents  CBC, T&S  Monitoring per protocol  Reviewed prenatal records    Reviewed plan with patient/partner, all questions asked/answered and they agree with plan    Thomas Woodruff CNM  11:21 PM

## 2022-05-16 NOTE — ED PROVIDER NOTES
[facility] History and Physical    Patient is a 29 y.o. Yousif Horace at 40w0d with jay 5/15/22 who presents to the [facility] with c/o contractions at 2234. She reports contractions started around midnight and are now about q5 minutes. She reports good FM. Denies VB, LOF, n/v/d, fever, cough, sore throat, SOB/difficulty breathing, loss of taste/smell, exposure to anyone with COVID, h/a, changes in vision, RUQ/epigastric pain. She reports prenatal care with Dr. 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 GHTN, although nothing noted about that hx on prenatal records        PNC: Blood type: A            RH: pos            Hep B: negative            Rubella: immune            GBS status: negative            HIV: non reactive            RPR/TPA/VDRL: non reactive            GC/CT: negative            HSV serology: not noted on prenatal records, but patient denies any hx of HSV           Past Medical History:   Diagnosis Date    Asthma     Essential hypertension     Phlebitis and thrombophlebitis     during pregnancy       Past Surgical History:   Procedure Laterality Date    DELIVERY VAGINAL  3/25/2020         HX OTHER SURGICAL      wisdom teeth    HX OTHER SURGICAL      \"lung surgery\" as a premature infant         Family History:   Problem Relation Age of Onset    Hypertension Mother     Cancer Father        Social History     Socioeconomic History    Marital status:      Spouse name: Not on file    Number of children: Not on file    Years of education: Not on file    Highest education level: Not on file   Occupational History    Not on file   Tobacco Use    Smoking status: Never Smoker    Smokeless tobacco: Never Used   Vaping Use    Vaping Use: Never used   Substance and Sexual Activity    Alcohol use: Never    Drug use: Never    Sexual activity: Yes   Other Topics Concern     Service Not Asked    Blood Transfusions Not Asked    Caffeine Concern Not Asked    Occupational Exposure Not Asked    Hobby Hazards Not Asked    Sleep Concern Not Asked    Stress Concern Not Asked    Weight Concern Not Asked    Special Diet Not Asked    Back Care Not Asked    Exercise Not Asked    Bike Helmet Not Asked   [address] Not Asked    Self-Exams Not Asked   Social History Narrative    Not on file     Social Determinants of Health     Financial Resource Strain:     Difficulty of Paying Living Expenses: Not on file   Food Insecurity:     Worried About Running Out of Food in the Last Year: Not on file    Leisa of Food in the Last Year: Not on file   Transportation Needs:     Lack of Transportation (Medical): Not on file    Lack of Transportation (Non-Medical): Not on file   Physical Activity:     Days of Exercise per Week: Not on file    Minutes of Exercise per Session: Not on file   Stress:     Feeling of Stress : Not on file   Social Connections:     Frequency of Communication with Friends and Family: Not on file    Frequency of Social Gatherings with Friends and Family: Not on file    Attends Confucianism Services: Not on file    Active Member of [address] [place] [address] [employer] or Organizations: Not on file    Attends Club or Organization Meetings: Not on file    Marital Status: Not on file   Intimate Partner Violence:     Fear of Current or Ex-Partner: Not on file    Emotionally Abused: Not on file    Physically Abused: Not on file    Sexually Abused: Not on file   Housing Stability:     Unable to Pay for Housing in the Last Year: Not on file    Number of [place] in the Last Year: Not on file    Unstable Housing in the Last Year: Not on file   Denies tobacco, alcohol, illicits  Denies hx STIs      ALLERGIES: Patient has no known allergies. Review of Systems   Constitutional: Negative for chills and fever.    Eyes: Negative for visual disturbance. Respiratory: Negative for cough and shortness of breath. Cardiovascular: Negative for chest pain and leg swelling. Gastrointestinal: Positive for abdominal pain. Negative for diarrhea, nausea and vomiting. Contractions   Genitourinary: Negative for vaginal bleeding and vaginal discharge. Musculoskeletal: Negative for gait problem. Neurological: Negative for speech difficulty and headaches. All other systems reviewed and are negative. Vitals:    05/15/22 2230 05/15/22 2331   BP: (!) 142/79    Pulse: 98    Resp: 16    Temp: 98.5 °F (36.9 °C)    SpO2: 99%    Weight:  95.7 kg (211 lb)   Height:  5' 4\" (1.626 m)            Physical Exam  Vitals and nursing note reviewed. Exam conducted with a chaperone present. Constitutional:       General: She is awake. She is not in acute distress. Appearance: Normal appearance. She is well-developed, well-groomed and normal weight. HENT:      Head: Normocephalic. Nose: Nose normal.   Cardiovascular:      Rate and Rhythm: Normal rate and regular rhythm. Pulses: Normal pulses. Pulmonary:      Effort: Pulmonary effort is normal. No respiratory distress. Abdominal:      Tenderness: There is no abdominal tenderness. Comments: Gravid c/w 40w  FHTs: 135s, +accels, neg decels, moderate variabiltiy  [place]: q2-4 minutes, moderate     Genitourinary:     General: Normal vulva. Exam position: Supine. Labia:         Right: No lesion. Left: No lesion. Vagina: Normal.      Cervix: Dilated. Uterus: Normal. Enlarged. Rectum: Normal.      Comments: SVE 4/80/-2, vtx  Membranes intact  Pelvis feels adequate  No lesions noted  Musculoskeletal:         General: Normal range of motion. Cervical back: Normal range of motion. Right lower le+ Edema present. Left lower le+ Edema present. Skin:     General: Skin is warm and dry.    Neurological:      Mental Status: She is alert and oriented to person, place, and time. Gait: Gait is intact. Psychiatric:         Mood and Affect: Mood normal.         Speech: Speech normal.         Behavior: Behavior normal. Behavior is cooperative. Thought Content: Thought content normal.         Cognition and Memory: Cognition and memory normal.         Judgment: Judgment normal.          MDM  Number of Diagnoses or Management Options     Amount and/or Complexity of Data Reviewed  Clinical lab tests: reviewed  Tests in the medicine section of CPT®: ordered and reviewed  Review and summarize past medical records: yes  Independent visualization of images, tracings, or specimens: yes (NST reactive over 20 minutes  FHTs: 135s, +accels, neg decels, moderate variabiltiy)    Risk of Complications, Morbidity, and/or Mortality  Presenting problems: moderate  Diagnostic procedures: moderate  Management options: moderate    Critical Care  Total time providing critical care: < 30 minutes    Patient Progress  Patient progress: stable    ED Course as of 05/15/22 2339   Sun May 15, 2022   2252 Here with c/o contractions q5 mintues. Denies VB/LOF. Good FM. SVE 4/80/-2.  Admit to L&D [SB]      ED Course User Index  [SB] Trell Causey CNM       Procedures  NST, SVE    Impression:  at 40w0d IUP  Labor  Cat 1 tracing  GBS negative  AMA  Obesity  Anxiety      Plan:  Admit to [facility] for eval  Reviewed SVE with patient/partner and will admit to L&D  Initial BP mildly elevated- will repeat if remains elevated, will send preE labs  Review and sign consents  CBC, T&S  Monitoring per protocol  Reviewed prenatal records    Reviewed plan with patient/partner, all questions asked/answered and they agree with plan

## 2022-05-16 NOTE — ROUTINE PROCESS
TRANSFER - IN REPORT:    Verbal report received from CRISTIN Nolasco (name) on Hiral Pickup  being received from L&D (unit) for routine progression of care      Report consisted of patients Situation, Background, Assessment and   Recommendations(SBAR). Information from the following report(s) SBAR was reviewed with the receiving nurse. Opportunity for questions and clarification was provided. Assessment completed upon patients arrival to unit and care assumed.

## 2022-05-16 NOTE — LACTATION NOTE
This note was copied from a baby's chart. Initial Lactation Consultation - baby born vaginally early this morning to a  mom at 36 1/7 weeks gestation. Mom states she breast fed her first child for 4 months and then exclusively pumped for another few months with an abundant milk supply. Mom states this baby has been latching and nursing well with the nipple shield but she is struggling some with the right breast. Moms nipples are everted and I helped her get baby positioned and then latched on the right breast without the nipple shield. Baby was sucking rhythmically with frequent gulping noted. Feeding Plan: Mother will keep baby skin to skin as often as possible, feed on demand, respond to feeding cues, obtain latch, listen for audible swallowing, be aware of signs of oxytocin release/ cramping, thirst and sleepiness while breastfeeding. Mom will not limit the time the baby is at the breast. She will allow the baby to completely finish one breast and then offer the second breast at each feeding. She will use the nipple shield as needed.

## 2022-05-17 PROCEDURE — 65410000002 HC RM PRIVATE OB

## 2022-05-17 PROCEDURE — 74011250637 HC RX REV CODE- 250/637: Performed by: ADVANCED PRACTICE MIDWIFE

## 2022-05-17 PROCEDURE — 2709999900 HC NON-CHARGEABLE SUPPLY

## 2022-05-17 RX ORDER — IBUPROFEN 600 MG/1
600 TABLET ORAL
Qty: 30 TABLET | Refills: 1 | Status: SHIPPED | OUTPATIENT
Start: 2022-05-17

## 2022-05-17 RX ADMIN — IBUPROFEN 600 MG: 600 TABLET, FILM COATED ORAL at 19:22

## 2022-05-17 RX ADMIN — IBUPROFEN 600 MG: 600 TABLET, FILM COATED ORAL at 10:08

## 2022-05-17 NOTE — LACTATION NOTE
This note was copied from a baby's chart. Mom has abrasions on both breasts which is making latching painful. She has been using a nipple shield but even with the shield the left nipple is very painful. We reviewed how to apply the nipple shield. She was not getting the shield on deep enough. With the shield applied better she said the latch was more tolerable. Baby was sucking rhythmically with frequent, audible swallows. She will continue to feed the baby according to his feeding cues. She will use the shield as needed. If its too painful for her to latch the baby she will pump for breast stimulation. Mom has jack newmans cream that she is using after feeding attempts.

## 2022-05-17 NOTE — DISCHARGE INSTRUCTIONS
POSTPARTUM DISCHARGE INSTRUCTIONS       Name:  Vandana Breaux  YOB: 1985  Admission Diagnosis:  40 weeks gestation of pregnancy [Z3A.40]  Abdominal pain during pregnancy in third trimester [O26.893, R10.9]     Discharge Diagnosis:    Problem List as of 5/17/2022 Never Reviewed          Codes Class Noted - Resolved    40 weeks gestation of pregnancy ICD-10-CM: Z3A.40  ICD-9-CM: V22.2  5/15/2022 - Present        Abdominal pain during pregnancy in third trimester ICD-10-CM: O26.893, R10.9  ICD-9-CM: 646.83, 789.00  5/15/2022 - Present        Normal labor ICD-10-CM: O80, Z37.9  ICD-9-CM: 968  3/24/2020 - Present        H/O: hypertension ICD-10-CM: Z86.79  ICD-9-CM: V12.59  3/24/2020 - Present    Overview Signed 3/24/2020  9:12 PM by Minus Floor, CNM     Suspect H/o CHTN;              Mild intermittent asthma without complication OTB-74-YL: J14.20  ICD-9-CM: 493.90  3/24/2020 - Present            Attending Physician:  Kenrick Kim MD    Delivery Type:  Vaginal Childbirth with Episiotomy, Laceration or Tear: What To Expect At [address] [place] [address] will slowly heal in the next few weeks. It is easy to get too tired and overwhelmed during the first weeks after your baby is born. Changes in your hormones can shift your mood without warning. You may find it hard to meet the extra demands on your energy and time. Take it easy on yourself. Follow-up care is a key part of your treatment and safety. Be sure to make and go to all appointments, and call your doctor if you are having problems. It's also a good idea to know your test results and keep a list of the medicines you take. How can you care for yourself at home? Vaginal Bleeding and Cramps  · After delivery, you will have a bloody discharge from the vagina. This will turn pink within a week and then white or yellow after about 10 days. It may last for 2 to 4 weeks or longer, until the uterus has healed.  Use pads instead of tampons until you stop bleeding. · Do not worry if you pass some blood clots, as long as they are smaller than a golf ball. If you have a tear or stitches in your vaginal area, change the pad at least every 4 hours to prevent soreness and infection. · You may have cramps for the first few days after childbirth. These are normal and occur as the uterus shrinks to normal size. Take an over-the-counter pain medicine, such as acetaminophen (Tylenol), ibuprofen (Advil, Motrin), or naproxen (Aleve), for cramps. Read and follow all instructions on the label. Do not take aspirin, because it can cause more bleeding. Do not take acetaminophen (Tylenol) and other acetaminophen containing medications (i.e. Percocet) at the same time. Episiotomy, Lacerations or Tears  · If you have stitches, they will dissolve on their own and do not need to be removed. · Put ice or a cold pack on your painful area for 10 to 20 minutes at a time, several times a day, for the first few days. Put a thin cloth between the ice and your skin. · Sit in a few inches of warm water (sitz bath) 3 times a day and after bowel movements. The warm water helps with pain and itching. If you do not have a tub, a warm shower might help. Breast fullness  · Your breasts may overfill (engorge) in the first few days after delivery. To help milk flow and to relieve pain, warm your breasts in the shower or by using warm, moist towels before nursing. · If you are not nursing, do not put warmth on your breasts or touch your breasts. Wear a tight bra or sports bra and use ice until the fullness goes away. This usually takes 2 to 3 days. · Put ice or a cold pack on your breast after nursing to reduce swelling and pain. Put a thin cloth between the ice and your skin. Activity  · Eat a balanced diet. Do not try to lose weight by cutting calories. Keep taking your prenatal vitamins, or take a multivitamin. · Get as much rest as you can.  Try to take naps when your baby sleeps during the day. · Get some exercise every day. But do not do any heavy exercise until your doctor says it is okay. · Wait until you are healed (about 4 to 6 weeks) before you have sexual intercourse. Your doctor will tell you when it is okay to have sex. · Talk to your doctor about birth control. You can get pregnant even before your period returns. Also, you can get pregnant while you are breast-feeding. Mental Health  · Many women get the \"baby blues\" during the first few days after childbirth. You may lose sleep, feel irritable, and cry easily. You may feel happy one minute and sad the next. Hormone changes are one cause of these emotional changes. Also, the demands of a new baby, along with visits from relatives or other family needs, add to a mother's stress. The \"baby blues\" often peak around the fourth day. Then they ease up in less than 2 weeks. · If your moodiness or anxiety lasts for more than 2 weeks, or if you feel like life is not worth living, you may have postpartum depression. This is different for each mother. Some mothers with serious depression may worry intensely about their infant's well-being. Others may feel distant from their child. Some mothers might even feel that they might harm their baby. A mother may have signs of paranoia, wondering if someone is watching her. · With all the changes in your life, you may not know if you are depressed. Pregnancy sometimes causes changes in how you feel that are similar to the symptoms of depression. · Symptoms of depression include:  · Feeling sad or hopeless and losing interest in daily activities. These are the most common symptoms of depression. · Sleeping too much or not enough. · Feeling tired. You may feel as if you have no energy. · Eating too much or too little. · POSTPARTUM SUPPORT [employer]ATIONAL ([employer]I) offers a Warm line; Chat with the Expert phone sessions;  Information and Articles about Pregnancy and Postpartum Mood Disorders; Comprehensive List of Free Support Groups; Knowledgeable local coordinators who will offer support, information, and resources; Guide to Resources on [employer]; Calendar of events in the  mood disorders community; Latest News and Research; and [facility] [address] for [employer]. Remember - You are not alone; You are not to blame; With help, you will be well. 6-038-184-PPD(7347). WWW. POSTPARTUM. NET    · Writing or talking about death, such as writing suicide notes or talking about guns, knives, or pills. Keep the numbers for these national suicide hotlines: 4-167-065-TALK (0-694.420.6473) and 7-830-BBHYNHV (7-756.535.7270). If you or someone you know talks about suicide or feeling hopeless, get help right away. Constipation and Hemorrhoids  Drink plenty of fluids, enough so that your urine is light yellow or clear like water. If you have kidney, heart, or liver disease and have to limit fluids, talk with your doctor before you increase the amount of fluids you drink. · Eat plenty of fiber each day. Have a bran muffin or bran cereal for breakfast, and try eating a piece of fruit for a mid-afternoon snack. · For painful, itchy hemorrhoids, put ice or a cold pack on the area several times a day for 10 minutes at a time. Follow this by putting a warm compress on the area for another 10 to 20 minutes or by sitting in a shallow, warm bath. When should you call for help? Call 911 anytime you think you may need emergency care. For example, call if:  · You are thinking of hurting yourself, your baby, or anyone else. · You passed out (lost consciousness). · You have symptoms of a blood clot in your lung (called a pulmonary embolism). These may include:  · Sudden chest pain. · Trouble breathing. · Coughing up blood. Call your doctor now or seek immediate medical care if:  · You have severe vaginal bleeding. · You are soaking through a pad each hour for 2 or more hours.   · Your vaginal bleeding seems to be getting heavier or is still bright red 4 days after delivery. · You are dizzy or lightheaded, or you feel like you may faint. · You are vomiting or cannot keep fluids down. · You have a fever. · You have new or more belly pain. · You pass tissue (not just blood). · Your vaginal discharge smells bad. · Your belly feels tender or full and hard. · Your breasts are continuously painful or red. · You feel sad, anxious, or hopeless for more than a few days. · You have sudden, severe pain in your belly. · You have symptoms of a blood clot in your leg (called a deep vein thrombosis), such as:  · Pain in your calf, back of the knee, thigh, or groin. · Redness and swelling in your leg or groin. · You have symptoms of preeclampsia, such as:  · Sudden swelling of your face, hands, or feet. · New vision problems (such as dimness or blurring). · A severe headache. · Your blood pressure is higher than it should be or rises suddenly. · You have new nausea or vomiting. Watch closely for changes in your health, and be sure to contact your doctor if you have any problems. Additional Information:  Postpartum Support    PARENTS:  Are you feeling sad or depressed? Is it difficult for you to enjoy yourself? Do you feel more irritable or tense? Do you feel anxious or panicky? Are you having difficulty bonding with your baby? Do you feel as if you are \"out of control\" or \"going crazy\"? Are you worried that you might hurt your baby or yourself? FAMILIES: Do you worry that something is wrong but don't know how to help? Do you think that your partner or spouse is having problems coping? Are you worried that it may never get better? While many women experience some mild mood change or \"the blues\" during or after the birth of a child, 1 in 9 women experience more significant symptoms of depression or anxiety. 1 in 10 Dads become depressed during the first year.     Things you can do  Being a good parent includes taking care of yourself. If you take care of yourself, you will be able to take better care of your baby and your family. · Talk to a counselor or healthcare provider who has training in  mood and anxiety problems. · Learn as much as you can about pregnancy and postpartum depression and anxiety. · Get support from family and friends. Ask for help when you need it. · Join a support group in your area or online. · Keep active by walking, stretching or whatever form of exercise helps you to feel better. · Get enough rest and time for yourself. · Eat a healthy diet. · Don't give up! It may take more than one try to get the right help you need. These are general instructions for a healthy lifestyle:    No smoking/ No tobacco products/ Avoid exposure to second hand smoke    Surgeon General's Warning:  Quitting smoking now greatly reduces serious risk to your health. Obesity, smoking, and sedentary lifestyle greatly increases your risk for illness    A healthy diet, regular physical exercise & weight monitoring are important for maintaining a healthy lifestyle    Recognize signs and symptoms of STROKE:    F-face looks uneven    A-arms unable to move or move unevenly    S-speech slurred or non-existent    T-time-call 911 as soon as signs and symptoms begin - DO NOT go       back to bed or wait to see if you get better - TIME IS BRAIN. I have had the opportunity to make my options or choices for discharge. I have received and understand these instructions. Postpartum Support Groups  We know that all of us are dealing with a tremendous amount of uncertainty, confusion and disruption to our daily lives, which may result in increased anxiety, depression and fear. If you are feeling unsettled or worse, please know that we are here to help.  During this time of increased caution and care for one another, Postpartum Support [facility] ([address]) is offering virtual support groups to ALL MOTHERS in [place] regardless of the age of your child/children as a way to help weather this emotional storm together. Social support is an important part of self-care during this time of physical distancing. Virtual postpartum support group meetings available at www. postpartumva.org  Warm Line: 916.393.4042    Breastfeeding Support Groups   1st and 3rd Wednesday of each month  2nd and 4th Tuesday of each month    [place] at www.[employer] under the \"About Us\" and \"Classes and Events tabs\"

## 2022-05-17 NOTE — PROGRESS NOTES
Bedside shift change report given to DEAN Alvarez (oncoming nurse) by Chan Bourne RN (offgoing nurse). Report included the following information SBAR.

## 2022-05-17 NOTE — DISCHARGE SUMMARY
Obstetrical Discharge Summary     Name: Melissa Bangura MRN: [id number]  SSN: [id number]    YOB: 1985  Age: 29 y.o. Sex: female      Admit Date: 5/15/2022    Discharge Date: 22    Admitting Physician: Yamilex Montague MD     Attending Physician:  Abbi Prieto MD     Admission Diagnoses: 40 weeks gestation of pregnancy [Z3A.40]  Abdominal pain during pregnancy in third trimester [O26.893, R10.9]    Discharge Diagnoses:   Information for the patient's :  Aranza Steve [[id number]]   Delivery of a 3.48 kg male infant via Vaginal, Spontaneous on 2022 at 2:55 AM  by [address] [place] [address] Road were 9  and 9 . Additional Diagnoses:   Hospital Problems  Never Reviewed          Codes Class Noted POA    40 weeks gestation of pregnancy ICD-10-CM: Z3A.40  ICD-9-CM: V22.2  5/15/2022 Unknown        Abdominal pain during pregnancy in third trimester ICD-10-CM: O26.893, R10.9  ICD-9-CM: 646.83, 789.00  5/15/2022 Unknown             Lab Results   Component Value Date/Time    Rubella, External immune 10/05/2021 12:00 AM    GrBStrep, External Negative 2022 12:00 AM       Hospital Course: Normal hospital course following the delivery. Patient Instructions:   Current Discharge Medication List      CONTINUE these medications which have CHANGED    Details   ibuprofen (MOTRIN) 600 mg tablet Take 1 Tablet by mouth every six (6) hours as needed for Pain. Qty: 30 Tablet, Refills: 1         CONTINUE these medications which have NOT CHANGED    Details   prenatal vit-iron fumarate-fa 27 mg iron- 0.8 mg tab tablet Take 1 Tab by mouth daily. Disposition at Discharge: Home or self care    Condition at Discharge: Stable    Reference my discharge instructions. No orders of the defined types were placed in this encounter.        Signed By:  Dayanara Veloz MD     May 17, 2022

## 2022-05-18 VITALS
OXYGEN SATURATION: 97 % | HEIGHT: 64 IN | BODY MASS INDEX: 36.02 KG/M2 | HEART RATE: 90 BPM | DIASTOLIC BLOOD PRESSURE: 82 MMHG | SYSTOLIC BLOOD PRESSURE: 119 MMHG | RESPIRATION RATE: 16 BRPM | WEIGHT: 211 LBS | TEMPERATURE: 98.4 F

## 2022-05-18 PROCEDURE — 74011250637 HC RX REV CODE- 250/637: Performed by: ADVANCED PRACTICE MIDWIFE

## 2022-05-18 RX ADMIN — IBUPROFEN 600 MG: 600 TABLET, FILM COATED ORAL at 03:10

## 2022-05-18 RX ADMIN — IBUPROFEN 600 MG: 600 TABLET, FILM COATED ORAL at 09:18

## 2022-05-18 NOTE — PROGRESS NOTES
Post-Partum Day Number 2 Progress Note    Antony Haynes     Assessment:   Doing well, post partum day 2  Boy circ done    Plan:   - Discharge home today  - Follow up in office in 6 week(s) with [facility].  - Pain medication prescription(s) sent. - Questions answered. Information for the patient's :  Navid Byrd [[id number]]   Vaginal, Spontaneous    Patient doing well without significant complaint. Voiding without difficulty, normal lochia. Ready for discharge home. Vitals:  Visit Vitals  /82 (BP 1 Location: Left upper arm, BP Patient Position: At rest;Sitting)   Pulse 90   Temp 98.4 °F (36.9 °C)   Resp 16   Ht 5' 4\" (1.626 m)   Wt 95.7 kg (211 lb)   SpO2 97%   Breastfeeding Unknown   BMI 36.22 kg/m²     Temp (24hrs), Av.1 °F (36.7 °C), Min:97.6 °F (36.4 °C), Max:98.9 °F (37.2 °C)      Exam:        Patient without distress. Fundus firm, nontender per nursing fundal checks                Perineum with normal lochia noted per nursing assessment                Lower extremities are negative for pathological edema    Labs:     Lab Results   Component Value Date/Time    WBC 11.7 (H) 05/15/2022 11:44 PM    WBC 16.1 (H) 2020 05:20 PM    HGB 12.2 05/15/2022 11:44 PM    HGB 12.2 2020 05:20 PM    HCT 38.0 05/15/2022 11:44 PM    HCT 37.2 2020 05:20 PM    PLATELET 657  11:44 PM    PLATELET 748  05:20 PM       No results found for this or any previous visit (from the past 24 hour(s)).

## 2022-05-18 NOTE — ROUTINE PROCESS
0750: Bedside and Verbal shift change report given to HOWARD Wilson RN (oncoming nurse) by DEAN Sheppard RN (offgoing nurse). Report included the following information SBAR, Kardex, Procedure Summary, Intake/Output, MAR and Recent Results. 1200: I have reviewed discharge instructions with the patient. The patient verbalized understanding.

## 2022-05-18 NOTE — PROGRESS NOTES
Bedside shift change report given to DEAN Molina (oncoming nurse) by Ghazala Spencer RN (offgoing nurse). Report included the following information SBAR.